# Patient Record
Sex: FEMALE | Race: BLACK OR AFRICAN AMERICAN | NOT HISPANIC OR LATINO | Employment: FULL TIME | URBAN - METROPOLITAN AREA
[De-identification: names, ages, dates, MRNs, and addresses within clinical notes are randomized per-mention and may not be internally consistent; named-entity substitution may affect disease eponyms.]

---

## 2017-12-04 ENCOUNTER — TELEPHONE (OUTPATIENT)
Dept: RADIOLOGY | Facility: HOSPITAL | Age: 37
End: 2017-12-04

## 2017-12-05 ENCOUNTER — HOSPITAL ENCOUNTER (OUTPATIENT)
Dept: RADIOLOGY | Facility: HOSPITAL | Age: 37
Discharge: HOME | End: 2017-12-05
Attending: INTERNAL MEDICINE
Payer: COMMERCIAL

## 2017-12-05 DIAGNOSIS — R10.2 PELVIC PAIN: ICD-10-CM

## 2017-12-05 PROCEDURE — 76830 TRANSVAGINAL US NON-OB: CPT | Mod: 26,,, | Performed by: RADIOLOGY

## 2017-12-05 PROCEDURE — 76856 US EXAM PELVIC COMPLETE: CPT | Mod: TC,PO

## 2017-12-05 PROCEDURE — 76856 US EXAM PELVIC COMPLETE: CPT | Mod: 26,,, | Performed by: RADIOLOGY

## 2019-03-06 PROBLEM — K13.0 DRY LIPS: Status: ACTIVE | Noted: 2019-03-06

## 2019-03-06 PROBLEM — J01.00 ACUTE NON-RECURRENT MAXILLARY SINUSITIS: Status: ACTIVE | Noted: 2019-03-06

## 2019-03-06 PROBLEM — J02.9 SORE THROAT: Status: ACTIVE | Noted: 2019-03-06

## 2019-03-06 PROBLEM — R68.2 DRY MOUTH: Status: ACTIVE | Noted: 2019-03-06

## 2019-03-10 PROBLEM — Z11.3 SCREEN FOR STD (SEXUALLY TRANSMITTED DISEASE): Status: ACTIVE | Noted: 2019-03-10

## 2019-03-15 PROBLEM — E53.8 BIOTIN DEFICIENCY DISEASE: Status: ACTIVE | Noted: 2019-03-15

## 2019-03-15 PROBLEM — E88.810 METABOLIC SYNDROME X: Status: ACTIVE | Noted: 2019-03-15

## 2019-03-15 PROBLEM — E55.9 VITAMIN D DEFICIENCY: Status: ACTIVE | Noted: 2019-03-15

## 2019-06-10 PROBLEM — J01.00 ACUTE NON-RECURRENT MAXILLARY SINUSITIS: Status: RESOLVED | Noted: 2019-03-06 | Resolved: 2019-06-10

## 2019-11-13 PROBLEM — F41.9 ANXIETY: Status: ACTIVE | Noted: 2019-11-13

## 2020-01-08 PROBLEM — K59.04 CHRONIC IDIOPATHIC CONSTIPATION: Status: ACTIVE | Noted: 2020-01-08

## 2020-01-08 PROBLEM — M54.50 LUMBAR SPINE PAIN: Status: ACTIVE | Noted: 2020-01-08

## 2020-02-11 PROBLEM — R68.2 DRY MOUTH: Status: RESOLVED | Noted: 2019-03-06 | Resolved: 2020-02-11

## 2020-02-11 PROBLEM — J02.9 SORE THROAT: Status: RESOLVED | Noted: 2019-03-06 | Resolved: 2020-02-11

## 2020-02-11 PROBLEM — Z00.00 ANNUAL PHYSICAL EXAM: Status: ACTIVE | Noted: 2019-03-10

## 2020-02-11 PROBLEM — Z11.3 SCREEN FOR STD (SEXUALLY TRANSMITTED DISEASE): Status: RESOLVED | Noted: 2019-03-10 | Resolved: 2020-02-11

## 2020-02-11 PROBLEM — K13.0 DRY LIPS: Status: RESOLVED | Noted: 2019-03-06 | Resolved: 2020-02-11

## 2020-03-04 ENCOUNTER — CLINICAL SUPPORT (OUTPATIENT)
Dept: AUDIOLOGY | Facility: CLINIC | Age: 40
End: 2020-03-04
Payer: COMMERCIAL

## 2020-03-04 DIAGNOSIS — H91.90 PERCEIVED HEARING LOSS: ICD-10-CM

## 2020-03-04 DIAGNOSIS — Z01.10 ENCOUNTER FOR EXAMINATION OF HEARING WITHOUT ABNORMAL FINDINGS: Primary | ICD-10-CM

## 2020-03-04 PROCEDURE — 92557 COMPREHENSIVE HEARING TEST: CPT | Mod: S$GLB,,, | Performed by: AUDIOLOGIST

## 2020-03-04 PROCEDURE — 92567 PR TYMPA2METRY: ICD-10-PCS | Mod: S$GLB,,, | Performed by: AUDIOLOGIST

## 2020-03-04 PROCEDURE — 99999 PR PBB SHADOW E&M-EST. PATIENT-LVL I: ICD-10-PCS | Mod: PBBFAC,,, | Performed by: AUDIOLOGIST

## 2020-03-04 PROCEDURE — 92567 TYMPANOMETRY: CPT | Mod: S$GLB,,, | Performed by: AUDIOLOGIST

## 2020-03-04 PROCEDURE — 99999 PR PBB SHADOW E&M-EST. PATIENT-LVL I: CPT | Mod: PBBFAC,,, | Performed by: AUDIOLOGIST

## 2020-03-04 PROCEDURE — 92557 PR COMPREHENSIVE HEARING TEST: ICD-10-PCS | Mod: S$GLB,,, | Performed by: AUDIOLOGIST

## 2020-03-04 NOTE — PROGRESS NOTES
Sharon Hutton was seen 03/04/2020 for an audiological evaluation.  Patient complains of possible bilateral gradual hearing loss. She reports having to ask her family members to repeat often. She denies any tinnitus or dizziness.     Results reveal normal hearing sensitivity 250-8000 Hz bilaterally.  Speech Reception Thresholds were  10 dBHL for the right ear and 10 dBHL for the left ear.   Word recognition scores were excellent bilaterally.  Tympanograms were Type A, normal for the right ear and Type A, normal for the left ear.    Patient was counseled on the above findings.    Recommendations include:    1. Wear hearing protective devices around loud noise and equipment.

## 2020-03-13 PROBLEM — R31.21 ASYMPTOMATIC MICROSCOPIC HEMATURIA: Status: ACTIVE | Noted: 2020-03-13

## 2020-05-18 PROBLEM — Z00.00 ANNUAL PHYSICAL EXAM: Status: RESOLVED | Noted: 2019-03-10 | Resolved: 2020-05-18

## 2020-10-12 PROBLEM — S83.207D: Status: ACTIVE | Noted: 2020-10-12

## 2021-10-13 RX ORDER — BENZONATATE 200 MG/1
CAPSULE ORAL
COMMUNITY
Start: 2021-10-04 | End: 2021-11-30

## 2022-10-20 PROBLEM — Z90.710 HISTORY OF ROBOT-ASSISTED LAPAROSCOPIC HYSTERECTOMY: Status: ACTIVE | Noted: 2022-10-20

## 2022-10-20 PROBLEM — Z98.890 S/P ROBOT-ASSISTED SURGICAL PROCEDURE: Status: ACTIVE | Noted: 2022-10-20

## 2022-10-20 PROBLEM — D25.9 UTERINE FIBROID: Status: ACTIVE | Noted: 2022-10-20

## 2023-03-27 ENCOUNTER — HOSPITAL ENCOUNTER (OUTPATIENT)
Dept: RADIOLOGY | Facility: HOSPITAL | Age: 43
Discharge: HOME | End: 2023-03-27
Attending: NURSE PRACTITIONER
Payer: COMMERCIAL

## 2023-03-27 ENCOUNTER — OFFICE VISIT (OUTPATIENT)
Dept: GASTROENTEROLOGY | Facility: CLINIC | Age: 43
End: 2023-03-27
Payer: COMMERCIAL

## 2023-03-27 VITALS
BODY MASS INDEX: 31.44 KG/M2 | HEIGHT: 65 IN | SYSTOLIC BLOOD PRESSURE: 128 MMHG | HEART RATE: 76 BPM | DIASTOLIC BLOOD PRESSURE: 76 MMHG | WEIGHT: 188.69 LBS

## 2023-03-27 DIAGNOSIS — K59.09 CHRONIC CONSTIPATION: Primary | ICD-10-CM

## 2023-03-27 DIAGNOSIS — K59.09 CHRONIC CONSTIPATION: ICD-10-CM

## 2023-03-27 DIAGNOSIS — K52.9 COLITIS: ICD-10-CM

## 2023-03-27 DIAGNOSIS — R10.84 GENERALIZED ABDOMINAL PAIN: ICD-10-CM

## 2023-03-27 PROCEDURE — 3074F PR MOST RECENT SYSTOLIC BLOOD PRESSURE < 130 MM HG: ICD-10-PCS | Mod: CPTII,S$GLB,, | Performed by: NURSE PRACTITIONER

## 2023-03-27 PROCEDURE — 99204 PR OFFICE/OUTPT VISIT, NEW, LEVL IV, 45-59 MIN: ICD-10-PCS | Mod: S$GLB,,, | Performed by: NURSE PRACTITIONER

## 2023-03-27 PROCEDURE — 3074F SYST BP LT 130 MM HG: CPT | Mod: CPTII,S$GLB,, | Performed by: NURSE PRACTITIONER

## 2023-03-27 PROCEDURE — 3078F PR MOST RECENT DIASTOLIC BLOOD PRESSURE < 80 MM HG: ICD-10-PCS | Mod: CPTII,S$GLB,, | Performed by: NURSE PRACTITIONER

## 2023-03-27 PROCEDURE — 74019 RADEX ABDOMEN 2 VIEWS: CPT | Mod: 26,,, | Performed by: RADIOLOGY

## 2023-03-27 PROCEDURE — 99999 PR PBB SHADOW E&M-EST. PATIENT-LVL IV: ICD-10-PCS | Mod: PBBFAC,,, | Performed by: NURSE PRACTITIONER

## 2023-03-27 PROCEDURE — 1159F PR MEDICATION LIST DOCUMENTED IN MEDICAL RECORD: ICD-10-PCS | Mod: CPTII,S$GLB,, | Performed by: NURSE PRACTITIONER

## 2023-03-27 PROCEDURE — 74019 XR ABDOMEN FLAT AND ERECT: ICD-10-PCS | Mod: 26,,, | Performed by: RADIOLOGY

## 2023-03-27 PROCEDURE — 3008F PR BODY MASS INDEX (BMI) DOCUMENTED: ICD-10-PCS | Mod: CPTII,S$GLB,, | Performed by: NURSE PRACTITIONER

## 2023-03-27 PROCEDURE — 3078F DIAST BP <80 MM HG: CPT | Mod: CPTII,S$GLB,, | Performed by: NURSE PRACTITIONER

## 2023-03-27 PROCEDURE — 1159F MED LIST DOCD IN RCRD: CPT | Mod: CPTII,S$GLB,, | Performed by: NURSE PRACTITIONER

## 2023-03-27 PROCEDURE — 99999 PR PBB SHADOW E&M-EST. PATIENT-LVL IV: CPT | Mod: PBBFAC,,, | Performed by: NURSE PRACTITIONER

## 2023-03-27 PROCEDURE — 99204 OFFICE O/P NEW MOD 45 MIN: CPT | Mod: S$GLB,,, | Performed by: NURSE PRACTITIONER

## 2023-03-27 PROCEDURE — 74019 RADEX ABDOMEN 2 VIEWS: CPT | Mod: TC

## 2023-03-27 PROCEDURE — 3008F BODY MASS INDEX DOCD: CPT | Mod: CPTII,S$GLB,, | Performed by: NURSE PRACTITIONER

## 2023-03-27 RX ORDER — SOD SULF/POT CHLORIDE/MAG SULF 1.479 G
TABLET ORAL
COMMUNITY
Start: 2023-03-17

## 2023-03-27 NOTE — PROGRESS NOTES
Clinic Consult:  [facility] Consultation Note    Reason for Consult:  The primary encounter diagnosis was Chronic constipation. Diagnoses of Generalized abdominal pain and Colitis were also pertinent to this visit.    PCP: Aaliyah Medrano   No address on file    HPI:  This is a 43 y.o. female here for evaluation of the above  Pt reports a long history of chronic constipation that she has been treating with OTC medications.   She states that about 2 weeks ago she started having severe abdominal pain that ultimately required evaluation at [facility].  She states that a CT was completed at that time and she was diagnosed with colitis.  She was started on Abx which she completed yesterday.  I do not have those records for review today.   She has had resolution of the severe abdominal pain.   Continues with constipation.   No previous colonoscopy.   Has had a previous episode of BRBPR after passing a hard stool  No fam hx of IBD or CRC.       Review of Systems   Constitutional:  Negative for chills, fever, malaise/fatigue and weight loss.   Respiratory:  Negative for cough.    Cardiovascular:  Negative for chest pain.   Gastrointestinal:         Per HPI   Musculoskeletal:  Negative for myalgias.   Skin:  Negative for itching and rash.   Neurological:  Negative for headaches.   Psychiatric/Behavioral:  The patient is not nervous/anxious.      Medical History:   Past Medical History:   Diagnosis Date    Adult general medical examination 04/10/2017    Anemia     Biotin deficiency disease     Hyperlipidemia     Metabolic syndrome X     Vitamin D deficiency        Surgical History:  Past Surgical History:   Procedure Laterality Date    BREAST BIOPSY Left 09/2020    HERNIA REPAIR      ROBOTIC HYSTERECTOMY, WITH SALPINGECTOMY  2016       Family History:   Family History   Problem Relation Age of Onset    Diabetes Mother     Hypertension Mother     Asthma Sister        Social History:   Social History     Tobacco  "Use    Smoking status: Never    Smokeless tobacco: Never   Substance Use Topics    Alcohol use: Yes       Allergies: Reviewed    Home Medications:   Current Outpatient Medications on File Prior to Visit   Medication Sig Dispense Refill    cholecalciferol, vitamin D3, (VITAMIN D3) 250 mcg (10,000 unit) Cap capsule Take 2 capsules (20,000 Units total) by mouth once a week. 24 capsule 1    meloxicam (MOBIC) 15 MG tablet TAKE 1 TABLET(15 MG) BY MOUTH EVERY DAY 90 tablet 0    metFORMIN (GLUCOPHAGE-XR) 500 MG ER 24hr tablet Take 1 tablet (500 mg total) by mouth daily with breakfast. 30 tablet 6    SUTAB 1.479-0.188- 0.225 gram tablet TAKE 24 TABLETS BY MOUTH SPLIT DOSE AS DIRECTED FOR COLONOSCOPY PREP      traMADoL (ULTRAM) 50 mg tablet Take 1 tablet (50 mg total) by mouth every 6 (six) hours. 30 each 0     No current facility-administered medications on file prior to visit.       Physical Exam:  Vital Signs:  /76 (BP Location: Left arm, Patient Position: Sitting, BP Method: Large (Manual))   Pulse 76   Ht 5' 5" (1.651 m)   Wt 85.6 kg (188 lb 11.4 oz)   BMI 31.40 kg/m²   Body mass index is 31.4 kg/m².  Physical Exam  Vitals reviewed.   Constitutional:       Appearance: She is well-developed.   HENT:      Head: Normocephalic.   Eyes:      General: No scleral icterus.  Cardiovascular:      Rate and Rhythm: Normal rate.   Pulmonary:      Effort: Pulmonary effort is normal.   Abdominal:      General: There is no distension.      Palpations: Abdomen is soft.   Musculoskeletal:         General: Normal range of motion.      Cervical back: Normal range of motion.   Skin:     General: Skin is dry.   Neurological:      Mental Status: She is alert and oriented to person, place, and time.       Labs: Pertinent labs reviewed.      Assessment:  1. Chronic constipation    2. Generalized abdominal pain    3. Colitis         Recommendations:  Chronic constipation  Generalized abdominal pain  - will get an x-ray today to " determine stool burden.   - will likely need a bowel purge followed by once daily miralax or prescription linzess or Amitiza depending on results  - increase water and dietary fiber.   -     X-Ray Abdomen Flat And Erect; Future; Expected date: 03/27/2023        Colitis  - ? infectious vs inflammatory  - will request records of imaging from [facility] for review  - pt will likely need a colonoscopy to ensure no chronic inflammatory disease, however, if there was evidence of diverticulitis, will need to push colonoscopy for 6-8 weeks to ensure appropriate healing.         Follow up to be determined by results of above.          Thank you so much for allowing me to participate in the care of TANYA Keyes

## 2023-04-03 ENCOUNTER — TELEPHONE (OUTPATIENT)
Dept: HEPATOLOGY | Facility: CLINIC | Age: 43
End: 2023-04-03
Payer: COMMERCIAL

## 2023-04-03 NOTE — TELEPHONE ENCOUNTER
Spoke with patient on [phone] in reference to receiving most recent CT sans from [facility] for review. Patient has been advised that we sent the request for the CT scan to be sent to us on 3/27/23, but to no avail. Patient states she will reach out to [facility] and request for them to send us the results.     ----- Message from ALANA Garay sent at 4/3/2023  2:00 PM CDT -----  Contact: Sharon  I do not have those records on my desk.  May need to re-request       ----- Message -----  From: Sury Ritchie MA  Sent: 4/3/2023   1:27 PM CDT  To: ALANA Garay    Patient is inquiring if you were able to review the scans from the hospital. Please advise.   ----- Message -----  From: Roselyn Hernandez  Sent: 4/3/2023  12:03 PM CDT  To: Antonia MENJIVAR Staff    Patient is calling to speak with someone regarding scans. Patient request to be informed if provider has viewed scans from hospital. Please give patient a call back at [phone] when possible.   Thank you,   GH

## 2023-04-18 ENCOUNTER — TELEPHONE (OUTPATIENT)
Dept: HEPATOLOGY | Facility: CLINIC | Age: 43
End: 2023-04-18
Payer: COMMERCIAL

## 2023-04-18 NOTE — TELEPHONE ENCOUNTER
Spoke with patient on [phone] in reference to resending fax to request most recent imaging from [facility]. Request sent to fax number provided. Patient voices understanding.     ----- Message from Mahi Calix sent at 4/18/2023  1:11 PM CDT -----  Contact: bri Tenorio is calling to speak with the nurse regarding records. Reports needing a record request for the [facility] for a scan that was done. Fax # [phone]. Please give the patient a call back at [phone]  Thanks leslie

## 2023-04-28 DIAGNOSIS — M25.521 RIGHT ELBOW PAIN: Primary | ICD-10-CM

## 2023-05-02 ENCOUNTER — OFFICE VISIT (OUTPATIENT)
Dept: ORTHOPEDICS | Facility: CLINIC | Age: 43
End: 2023-05-02
Payer: COMMERCIAL

## 2023-05-02 ENCOUNTER — HOSPITAL ENCOUNTER (OUTPATIENT)
Dept: RADIOLOGY | Facility: HOSPITAL | Age: 43
Discharge: HOME | End: 2023-05-02
Attending: ORTHOPAEDIC SURGERY
Payer: COMMERCIAL

## 2023-05-02 VITALS — HEIGHT: 65 IN | WEIGHT: 188 LBS | BODY MASS INDEX: 31.32 KG/M2

## 2023-05-02 DIAGNOSIS — M77.11 LATERAL EPICONDYLITIS OF RIGHT ELBOW: Primary | ICD-10-CM

## 2023-05-02 DIAGNOSIS — M25.521 RIGHT ELBOW PAIN: ICD-10-CM

## 2023-05-02 PROCEDURE — 99999 PR PBB SHADOW E&M-EST. PATIENT-LVL III: CPT | Mod: PBBFAC,,, | Performed by: ORTHOPAEDIC SURGERY

## 2023-05-02 PROCEDURE — 99213 PR OFFICE/OUTPT VISIT, EST, LEVL III, 20-29 MIN: ICD-10-PCS | Mod: 25,S$GLB,, | Performed by: ORTHOPAEDIC SURGERY

## 2023-05-02 PROCEDURE — 73080 XR ELBOW COMPLETE 3 VIEW RIGHT: ICD-10-PCS | Mod: 26,RT,, | Performed by: RADIOLOGY

## 2023-05-02 PROCEDURE — 1160F PR REVIEW ALL MEDS BY PRESCRIBER/CLIN PHARMACIST DOCUMENTED: ICD-10-PCS | Mod: CPTII,S$GLB,, | Performed by: ORTHOPAEDIC SURGERY

## 2023-05-02 PROCEDURE — 3008F PR BODY MASS INDEX (BMI) DOCUMENTED: ICD-10-PCS | Mod: CPTII,S$GLB,, | Performed by: ORTHOPAEDIC SURGERY

## 2023-05-02 PROCEDURE — 1159F PR MEDICATION LIST DOCUMENTED IN MEDICAL RECORD: ICD-10-PCS | Mod: CPTII,S$GLB,, | Performed by: ORTHOPAEDIC SURGERY

## 2023-05-02 PROCEDURE — 20551 NJX 1 TENDON ORIGIN/INSJ: CPT | Mod: RT,S$GLB,, | Performed by: ORTHOPAEDIC SURGERY

## 2023-05-02 PROCEDURE — 73080 X-RAY EXAM OF ELBOW: CPT | Mod: TC,RT

## 2023-05-02 PROCEDURE — 99213 OFFICE O/P EST LOW 20 MIN: CPT | Mod: 25,S$GLB,, | Performed by: ORTHOPAEDIC SURGERY

## 2023-05-02 PROCEDURE — 73080 X-RAY EXAM OF ELBOW: CPT | Mod: 26,RT,, | Performed by: RADIOLOGY

## 2023-05-02 PROCEDURE — 3008F BODY MASS INDEX DOCD: CPT | Mod: CPTII,S$GLB,, | Performed by: ORTHOPAEDIC SURGERY

## 2023-05-02 PROCEDURE — 20551 TENDON ORIGIN: R ELBOW: ICD-10-PCS | Mod: RT,S$GLB,, | Performed by: ORTHOPAEDIC SURGERY

## 2023-05-02 PROCEDURE — 99999 PR PBB SHADOW E&M-EST. PATIENT-LVL III: ICD-10-PCS | Mod: PBBFAC,,, | Performed by: ORTHOPAEDIC SURGERY

## 2023-05-02 PROCEDURE — 1160F RVW MEDS BY RX/DR IN RCRD: CPT | Mod: CPTII,S$GLB,, | Performed by: ORTHOPAEDIC SURGERY

## 2023-05-02 PROCEDURE — 1159F MED LIST DOCD IN RCRD: CPT | Mod: CPTII,S$GLB,, | Performed by: ORTHOPAEDIC SURGERY

## 2023-05-02 RX ORDER — TRIAMCINOLONE ACETONIDE 40 MG/ML
40 INJECTION, SUSPENSION INTRA-ARTICULAR; INTRAMUSCULAR
Status: DISCONTINUED | OUTPATIENT
Start: 2023-05-02 | End: 2023-05-02 | Stop reason: HOSPADM

## 2023-05-02 RX ADMIN — TRIAMCINOLONE ACETONIDE 40 MG: 40 INJECTION, SUSPENSION INTRA-ARTICULAR; INTRAMUSCULAR at 09:05

## 2023-05-02 NOTE — PROCEDURES
Tendon Origin: R elbow    Date/Time: 5/2/2023 9:30 AM  Performed by: Jesse Cooper MD  Authorized by: Jesse Cooper MD     Consent Done?:  Yes (Verbal)  Timeout: prior to procedure the correct patient, procedure, and site was verified    Indications:  Pain  Timeout: prior to procedure the correct patient, procedure, and site was verified    Location:  Elbow  Site:  R elbow  Prep: patient was prepped and draped in usual sterile fashion    Needle size:  25 G  Medications:  40 mg triamcinolone acetonide 40 mg/mL

## 2023-05-02 NOTE — PROGRESS NOTES
Subjective:     Patient ID: Sharon Hutton is a 43 y.o. female.    Chief Complaint: Pain of the Right Elbow      HPI:  The patient is a 43-year-old female with right elbow lateral epicondylitis for the last 4 months.  She is not tried injection.  Her daughter has a tennis elbow brace that she will try    Past Medical History:   Diagnosis Date    Adult general medical examination 04/10/2017    Anemia     Biotin deficiency disease     Hyperlipidemia     Metabolic syndrome X     Vitamin D deficiency      Past Surgical History:   Procedure Laterality Date    BREAST BIOPSY Left 09/2020    HERNIA REPAIR      ROBOTIC HYSTERECTOMY, WITH SALPINGECTOMY  2016     Family History   Problem Relation Age of Onset    Diabetes Mother     Hypertension Mother     Asthma Sister      Social History     Socioeconomic History    Marital status:    Tobacco Use    Smoking status: Never    Smokeless tobacco: Never   Substance and Sexual Activity    Alcohol use: Yes     Medication List with Changes/Refills   Current Medications    CHOLECALCIFEROL, VITAMIN D3, (VITAMIN D3) 250 MCG (10,000 UNIT) CAP CAPSULE    Take 2 capsules (20,000 Units total) by mouth once a week.    FLUCONAZOLE (DIFLUCAN) 150 MG TAB    One tab now and one tab in one week    MELOXICAM (MOBIC) 15 MG TABLET    TAKE 1 TABLET(15 MG) BY MOUTH EVERY DAY    METFORMIN (GLUCOPHAGE-XR) 500 MG ER 24HR TABLET    Take 1 tablet (500 mg total) by mouth daily with breakfast.    SUTAB 1.479-0.188- 0.225 GRAM TABLET    TAKE 24 TABLETS BY MOUTH SPLIT DOSE AS DIRECTED FOR COLONOSCOPY PREP    TRAMADOL (ULTRAM) 50 MG TABLET    Take 1 tablet (50 mg total) by mouth every 6 (six) hours.     Review of patient's allergies indicates:   Allergen Reactions    Trulicity [dulaglutide]      Abdominal pain     Review of Systems   Constitutional: Negative for malaise/fatigue.   HENT:  Negative for hearing loss.    Eyes:  Negative for double vision and visual disturbance.   Cardiovascular:  Negative for  chest pain.   Respiratory:  Negative for shortness of breath.    Endocrine: Negative for cold intolerance.   Hematologic/Lymphatic: Does not bruise/bleed easily.   Skin:  Negative for poor wound healing and suspicious lesions.   Musculoskeletal:  Positive for back pain. Negative for gout, joint pain and joint swelling.   Gastrointestinal:  Positive for constipation. Negative for nausea and vomiting.   Genitourinary:  Positive for hematuria. Negative for dysuria.   Neurological:  Negative for numbness, paresthesias and sensory change.   Psychiatric/Behavioral:  Negative for depression, memory loss and substance abuse. The patient is nervous/anxious.    Allergic/Immunologic: Negative for persistent infections.     Objective:   Body mass index is 31.28 kg/m².  There were no vitals filed for this visit.             General    Constitutional: She is oriented to person, place, and time. She appears well-developed and well-nourished. No distress.   HENT:   Head: Normocephalic.   Eyes: EOM are normal.   Pulmonary/Chest: Effort normal.   Neurological: She is oriented to person, place, and time.   Psychiatric: She has a normal mood and affect.             Right Hand/Wrist Exam     Inspection   Scars: Wrist - absent   Effusion: Wrist - absent     Other     Neuorologic Exam    Median Distribution: normal  Ulnar Distribution: normal  Radial Distribution: normal      Right Elbow Exam     Inspection   Scars: absent  Effusion: absent    Pain   The patient exhibits pain of the lateral epicondyle    Other   Sensation: normal    Comments:  The patient has tenderness right elbow lateral epicondyle with pain on resisted wrist and finger extension.          Vascular Exam       Capillary Refill  Right Hand: normal capillary refill        Relevant imaging results reviewed and interpreted by me, discussed with the patient and / or family today radiographs right elbow were normal  Assessment:     Encounter Diagnoses   Name Primary?    Right  elbow pain     Lateral epicondylitis of right elbow Yes        Plan:       The patient injected right elbow lateral epicondyle with 1 cc of Kenalog and 1 cc of 2% plain lidocaine.  She will use her daughters tennis elbow brace.  She will wait at least 3 months between injections.  I recommended Dr. Rangel gr procedure at the [facility] if her symptoms recur              Disclaimer: This note was prepared using a voice recognition system and is likely to have sound alike errors within the text.

## 2023-12-13 ENCOUNTER — OFFICE VISIT (OUTPATIENT)
Dept: ORTHOPEDICS | Facility: CLINIC | Age: 43
End: 2023-12-13
Payer: COMMERCIAL

## 2023-12-13 VITALS — HEIGHT: 65 IN | BODY MASS INDEX: 32.32 KG/M2 | WEIGHT: 194 LBS

## 2023-12-13 DIAGNOSIS — M77.11 LATERAL EPICONDYLITIS OF RIGHT ELBOW: Primary | ICD-10-CM

## 2023-12-13 PROCEDURE — 20551 NJX 1 TENDON ORIGIN/INSJ: CPT | Mod: RT,S$GLB,, | Performed by: ORTHOPAEDIC SURGERY

## 2023-12-13 PROCEDURE — 3008F BODY MASS INDEX DOCD: CPT | Mod: CPTII,S$GLB,, | Performed by: ORTHOPAEDIC SURGERY

## 2023-12-13 PROCEDURE — 20551 TENDON ORIGIN: R ELBOW: ICD-10-PCS | Mod: RT,S$GLB,, | Performed by: ORTHOPAEDIC SURGERY

## 2023-12-13 PROCEDURE — 1159F PR MEDICATION LIST DOCUMENTED IN MEDICAL RECORD: ICD-10-PCS | Mod: CPTII,S$GLB,, | Performed by: ORTHOPAEDIC SURGERY

## 2023-12-13 PROCEDURE — 1160F RVW MEDS BY RX/DR IN RCRD: CPT | Mod: CPTII,S$GLB,, | Performed by: ORTHOPAEDIC SURGERY

## 2023-12-13 PROCEDURE — 1159F MED LIST DOCD IN RCRD: CPT | Mod: CPTII,S$GLB,, | Performed by: ORTHOPAEDIC SURGERY

## 2023-12-13 PROCEDURE — 99999 PR PBB SHADOW E&M-EST. PATIENT-LVL III: ICD-10-PCS | Mod: PBBFAC,,, | Performed by: ORTHOPAEDIC SURGERY

## 2023-12-13 PROCEDURE — 99213 OFFICE O/P EST LOW 20 MIN: CPT | Mod: 25,S$GLB,, | Performed by: ORTHOPAEDIC SURGERY

## 2023-12-13 PROCEDURE — 3044F PR MOST RECENT HEMOGLOBIN A1C LEVEL <7.0%: ICD-10-PCS | Mod: CPTII,S$GLB,, | Performed by: ORTHOPAEDIC SURGERY

## 2023-12-13 PROCEDURE — 99999 PR PBB SHADOW E&M-EST. PATIENT-LVL III: CPT | Mod: PBBFAC,,, | Performed by: ORTHOPAEDIC SURGERY

## 2023-12-13 PROCEDURE — 99213 PR OFFICE/OUTPT VISIT, EST, LEVL III, 20-29 MIN: ICD-10-PCS | Mod: 25,S$GLB,, | Performed by: ORTHOPAEDIC SURGERY

## 2023-12-13 PROCEDURE — 3008F PR BODY MASS INDEX (BMI) DOCUMENTED: ICD-10-PCS | Mod: CPTII,S$GLB,, | Performed by: ORTHOPAEDIC SURGERY

## 2023-12-13 PROCEDURE — 3044F HG A1C LEVEL LT 7.0%: CPT | Mod: CPTII,S$GLB,, | Performed by: ORTHOPAEDIC SURGERY

## 2023-12-13 PROCEDURE — 1160F PR REVIEW ALL MEDS BY PRESCRIBER/CLIN PHARMACIST DOCUMENTED: ICD-10-PCS | Mod: CPTII,S$GLB,, | Performed by: ORTHOPAEDIC SURGERY

## 2023-12-13 RX ORDER — TRIAMCINOLONE ACETONIDE 40 MG/ML
40 INJECTION, SUSPENSION INTRA-ARTICULAR; INTRAMUSCULAR
Status: DISCONTINUED | OUTPATIENT
Start: 2023-12-13 | End: 2023-12-13 | Stop reason: HOSPADM

## 2023-12-13 RX ADMIN — TRIAMCINOLONE ACETONIDE 40 MG: 40 INJECTION, SUSPENSION INTRA-ARTICULAR; INTRAMUSCULAR at 09:12

## 2023-12-13 NOTE — PROCEDURES
Tendon Origin: R elbow    Date/Time: 12/13/2023 9:00 AM    Performed by: Jesse Cooper MD  Authorized by: Jesse Cooper MD    Consent Done?:  Yes (Verbal)  Indications:  Pain  Location:  Elbow  Site:  R elbow  Ultrasonic Guidance for Needle Placement?: No    Needle size:  25 G  Approach:  Volar  Medications:  40 mg triamcinolone acetonide 40 mg/mL

## 2023-12-13 NOTE — PROGRESS NOTES
Subjective:     Patient ID: Sharon Hutton is a 43 y.o. female.    Chief Complaint: Pain of the Right Elbow      HPI:  The patient is a 43-year-old female with right elbow lateral epicondylitis.  She was last injected 05/02/2023 with good results until recently and requests reinjection today    Past Medical History:   Diagnosis Date    Adult general medical examination 04/10/2017    Anemia     Biotin deficiency disease     Hyperlipidemia     Metabolic syndrome X     Vitamin D deficiency      Past Surgical History:   Procedure Laterality Date    BREAST BIOPSY Left 09/2020    HERNIA REPAIR      ROBOTIC HYSTERECTOMY, WITH SALPINGECTOMY  2016     Family History   Problem Relation Age of Onset    Diabetes Mother     Hypertension Mother     Asthma Sister      Social History     Socioeconomic History    Marital status:    Tobacco Use    Smoking status: Never    Smokeless tobacco: Never   Substance and Sexual Activity    Alcohol use: Yes     Medication List with Changes/Refills   Current Medications    CHOLECALCIFEROL, VITAMIN D3, (VITAMIN D3) 250 MCG (10,000 UNIT) CAP CAPSULE    Take 2 capsules (20,000 Units total) by mouth once a week.    FERROUS SULFATE (FEOSOL) 325 MG (65 MG IRON) TAB TABLET    Take 1 tablet (325 mg total) by mouth daily with breakfast.    LACTOBACILLUS COMBINATION NO.9 (ADULT 50 PLUS PROBIOTIC) 4 BILLION CELL CAP    Take by mouth.    METFORMIN (GLUCOPHAGE-XR) 500 MG ER 24HR TABLET    Take 2 tablets (1,000 mg total) by mouth daily with breakfast.    SUTAB 1.479-0.188- 0.225 GRAM TABLET    TAKE 24 TABLETS BY MOUTH SPLIT DOSE AS DIRECTED FOR COLONOSCOPY PREP    TIRZEPATIDE (MOUNJARO) 2.5 MG/0.5 ML PNIJ    Inject 2.5 mg into the skin every 7 days.     Review of patient's allergies indicates:   Allergen Reactions    Trulicity [dulaglutide]      Abdominal pain     Review of Systems   Constitutional: Negative for malaise/fatigue.   HENT:  Negative for hearing loss.    Eyes:  Negative for double vision  and visual disturbance.   Cardiovascular:  Negative for chest pain.   Respiratory:  Negative for shortness of breath.    Endocrine: Negative for cold intolerance.   Hematologic/Lymphatic: Does not bruise/bleed easily.   Skin:  Negative for poor wound healing and suspicious lesions.   Musculoskeletal:  Negative for gout, joint pain and joint swelling.   Gastrointestinal:  Positive for constipation. Negative for nausea and vomiting.   Genitourinary:  Positive for hematuria. Negative for dysuria.   Neurological:  Negative for numbness, paresthesias and sensory change.   Psychiatric/Behavioral:  Negative for depression, memory loss and substance abuse. The patient is nervous/anxious.    Allergic/Immunologic: Negative for persistent infections.       Objective:   Body mass index is 32.28 kg/m².  There were no vitals filed for this visit.             General    Constitutional: She is oriented to person, place, and time. She appears well-developed and well-nourished. No distress.   HENT:   Head: Normocephalic.   Eyes: EOM are normal.   Pulmonary/Chest: Effort normal.   Neurological: She is oriented to person, place, and time.   Psychiatric: She has a normal mood and affect.             Right Hand/Wrist Exam     Other     Neuorologic Exam    Median Distribution: normal  Ulnar Distribution: normal  Radial Distribution: normal      Right Elbow Exam     Inspection   Scars: absent  Effusion: absent    Pain   The patient exhibits pain of the lateral epicondyle    Other   Sensation: normal    Comments:  The patient has tenderness well localized to the right elbow lateral epicondyle with pain on resisted wrist and finger extension          Vascular Exam       Capillary Refill  Right Hand: normal capillary refill          Relevant imaging results reviewed and interpreted by me, discussed with the patient and / or family today radiographs right elbow were normal  Assessment:     Encounter Diagnosis   Name Primary?    Lateral  epicondylitis of right elbow Yes        Plan:     The patient injected right elbow lateral epicondyle with 1 cc of Kenalog and 1 cc of 2% plain lidocaine.  If she continues to be symptomatic she may be a candidate for a tenjet procedure by Dr. Multani at the [facility]                Disclaimer: This note was prepared using a voice recognition system and is likely to have sound alike errors within the text.

## 2024-04-11 ENCOUNTER — TELEPHONE (OUTPATIENT)
Dept: ORTHOPEDICS | Facility: CLINIC | Age: 44
End: 2024-04-11
Payer: COMMERCIAL

## 2024-04-11 NOTE — TELEPHONE ENCOUNTER
Spoke to the patient an was able to get her scheduled with Dr. Cooper for tomorrow morning at 8:00 the patient verbalized understanding.       ----- Message from Makayla Booth sent at 4/11/2024  8:13 AM CDT -----  Contact: Sharon Tenorio is calling to schedule f/u injection appt for elbow. Please call back at [phone].        Thanks

## 2024-04-12 ENCOUNTER — OFFICE VISIT (OUTPATIENT)
Dept: ORTHOPEDICS | Facility: CLINIC | Age: 44
End: 2024-04-12
Payer: COMMERCIAL

## 2024-04-12 VITALS — WEIGHT: 184.94 LBS | HEIGHT: 65 IN | BODY MASS INDEX: 30.81 KG/M2

## 2024-04-12 DIAGNOSIS — M77.11 LATERAL EPICONDYLITIS OF RIGHT ELBOW: Primary | ICD-10-CM

## 2024-04-12 PROCEDURE — 99999 PR PBB SHADOW E&M-EST. PATIENT-LVL III: CPT | Mod: PBBFAC,,, | Performed by: ORTHOPAEDIC SURGERY

## 2024-04-12 PROCEDURE — 3008F BODY MASS INDEX DOCD: CPT | Mod: CPTII,S$GLB,, | Performed by: ORTHOPAEDIC SURGERY

## 2024-04-12 PROCEDURE — 3044F HG A1C LEVEL LT 7.0%: CPT | Mod: CPTII,S$GLB,, | Performed by: ORTHOPAEDIC SURGERY

## 2024-04-12 PROCEDURE — 99213 OFFICE O/P EST LOW 20 MIN: CPT | Mod: 25,S$GLB,, | Performed by: ORTHOPAEDIC SURGERY

## 2024-04-12 PROCEDURE — 1159F MED LIST DOCD IN RCRD: CPT | Mod: CPTII,S$GLB,, | Performed by: ORTHOPAEDIC SURGERY

## 2024-04-12 PROCEDURE — 1160F RVW MEDS BY RX/DR IN RCRD: CPT | Mod: CPTII,S$GLB,, | Performed by: ORTHOPAEDIC SURGERY

## 2024-04-12 PROCEDURE — 20551 NJX 1 TENDON ORIGIN/INSJ: CPT | Mod: S$GLB,,, | Performed by: ORTHOPAEDIC SURGERY

## 2024-04-12 RX ORDER — TRIAMCINOLONE ACETONIDE 40 MG/ML
40 INJECTION, SUSPENSION INTRA-ARTICULAR; INTRAMUSCULAR
Status: DISCONTINUED | OUTPATIENT
Start: 2024-04-12 | End: 2024-04-12 | Stop reason: HOSPADM

## 2024-04-12 RX ADMIN — TRIAMCINOLONE ACETONIDE 40 MG: 40 INJECTION, SUSPENSION INTRA-ARTICULAR; INTRAMUSCULAR at 08:04

## 2024-04-12 NOTE — PROGRESS NOTES
Subjective:     Patient ID: Sharon Hutton is a 44 y.o. female.    Chief Complaint: Pain of the Right Elbow      HPI:  The patient is a 44-year-old female with right elbow lateral epicondylitis last injected 12/13/2023.  She requests injection today    Past Medical History:   Diagnosis Date    Adult general medical examination 04/10/2017    Anemia     Biotin deficiency disease     Hyperlipidemia     Metabolic syndrome X     Vitamin D deficiency      Past Surgical History:   Procedure Laterality Date    BREAST BIOPSY Left 09/2020    HERNIA REPAIR      ROBOTIC HYSTERECTOMY, WITH SALPINGECTOMY  2016     Family History   Problem Relation Age of Onset    Diabetes Mother     Hypertension Mother     Asthma Sister      Social History     Socioeconomic History    Marital status:    Tobacco Use    Smoking status: Never    Smokeless tobacco: Never   Substance and Sexual Activity    Alcohol use: Yes     Medication List with Changes/Refills   Current Medications    ALPRAZOLAM (XANAX) 0.25 MG TABLET    Take 1 tablet (0.25 mg total) by mouth 2 (two) times daily as needed for Anxiety.    CHOLECALCIFEROL, VITAMIN D3, (VITAMIN D3) 250 MCG (10,000 UNIT) CAP CAPSULE    Take 2 capsules (20,000 Units total) by mouth once a week.    FERROUS SULFATE (FEOSOL) 325 MG (65 MG IRON) TAB TABLET    Take 1 tablet (325 mg total) by mouth daily with breakfast.    LACTOBACILLUS COMBINATION NO.9 (ADULT 50 PLUS PROBIOTIC) 4 BILLION CELL CAP    Take by mouth.    METFORMIN (GLUCOPHAGE-XR) 500 MG ER 24HR TABLET    Take 2 tablets (1,000 mg total) by mouth daily with breakfast.    SUTAB 1.479-0.188- 0.225 GRAM TABLET    TAKE 24 TABLETS BY MOUTH SPLIT DOSE AS DIRECTED FOR COLONOSCOPY PREP    TIRZEPATIDE, WEIGHT LOSS, (ZEPBOUND) 2.5 MG/0.5 ML PNIJ    Inject 2.5 mg into the skin every 7 days.     Review of patient's allergies indicates:   Allergen Reactions    Trulicity [dulaglutide]      Abdominal pain     Review of Systems   Constitutional: Negative  for malaise/fatigue.   HENT:  Negative for hearing loss.    Eyes:  Negative for double vision and visual disturbance.   Cardiovascular:  Negative for chest pain.   Respiratory:  Negative for shortness of breath.    Endocrine: Negative for cold intolerance.   Hematologic/Lymphatic: Does not bruise/bleed easily.   Skin:  Negative for poor wound healing and suspicious lesions.   Musculoskeletal:  Positive for back pain. Negative for gout, joint pain and joint swelling.   Gastrointestinal:  Positive for abdominal pain. Negative for nausea and vomiting.   Genitourinary:  Positive for hematuria. Negative for dysuria.   Neurological:  Negative for numbness, paresthesias and sensory change.   Psychiatric/Behavioral:  Negative for depression, memory loss and substance abuse. The patient is nervous/anxious.    Allergic/Immunologic: Negative for persistent infections.       Objective:   Body mass index is 30.78 kg/m².  There were no vitals filed for this visit.             General    Constitutional: She is oriented to person, place, and time. She appears well-developed and well-nourished. No distress.   HENT:   Head: Normocephalic.   Eyes: EOM are normal.   Pulmonary/Chest: Effort normal.   Neurological: She is oriented to person, place, and time.   Psychiatric: She has a normal mood and affect.             Right Hand/Wrist Exam     Other     Neuorologic Exam    Median Distribution: normal  Ulnar Distribution: normal  Radial Distribution: normal      Right Elbow Exam     Inspection   Scars: absent  Effusion: absent    Pain   The patient exhibits pain of the lateral epicondyle    Other   Sensation: normal    Comments:  The patient has tenderness well localized right elbow lateral epicondyle with pain on resisted wrist and finger extension          Vascular Exam       Capillary Refill  Right Hand: normal capillary refill         radiographs were not obtained today  Assessment:     Encounter Diagnosis   Name Primary?    Lateral  epicondylitis of right elbow Yes        Plan:   The patient injected right elbow lateral epicondyle with 1 cc Kenalog and 1 cc 2% plain lidocaine under sterile technique.  She will wait at least 3 months between injections.  She may wish to consider tenjet procedure with Dr. Multani and was given literature about that                Disclaimer: This note was prepared using a voice recognition system and is likely to have sound alike errors within the text.

## 2024-04-12 NOTE — PROCEDURES
Tendon Origin: R elbow    Date/Time: 4/12/2024 8:00 AM    Performed by: Jesse Cooper MD  Authorized by: Jesse Cooper MD    Consent Done?:  Yes (Verbal)  Timeout: prior to procedure the correct patient, procedure, and site was verified    Timeout: prior to procedure the correct patient, procedure, and site was verified    Location:  Elbow  Site:  R elbow  Prep: patient was prepped and draped in usual sterile fashion    Needle size:  25 G  Medications:  40 mg triamcinolone acetonide 40 mg/mL

## 2024-07-26 ENCOUNTER — TELEPHONE (OUTPATIENT)
Dept: HEMATOLOGY/ONCOLOGY | Facility: CLINIC | Age: 44
End: 2024-07-26
Payer: COMMERCIAL

## 2024-07-26 NOTE — TELEPHONE ENCOUNTER
Left message in reference to Hematology referral from Dr. Aaliyah Medrano.  [facility] message sent.

## 2024-08-06 ENCOUNTER — TELEPHONE (OUTPATIENT)
Dept: HEMATOLOGY/ONCOLOGY | Facility: CLINIC | Age: 44
End: 2024-08-06
Payer: COMMERCIAL

## 2024-08-06 DIAGNOSIS — D50.9 IDA (IRON DEFICIENCY ANEMIA): Primary | ICD-10-CM

## 2024-08-13 ENCOUNTER — TELEPHONE (OUTPATIENT)
Dept: PHARMACY | Facility: CLINIC | Age: 44
End: 2024-08-13
Payer: COMMERCIAL

## 2024-08-19 ENCOUNTER — TELEPHONE (OUTPATIENT)
Dept: ORTHOPEDICS | Facility: CLINIC | Age: 44
End: 2024-08-19
Payer: COMMERCIAL

## 2024-08-19 NOTE — TELEPHONE ENCOUNTER
Attempted to return the patients phone call there was no answer so I left a voicemail.       ----- Message from Devendra Fuller sent at 8/19/2024  8:06 AM CDT -----  Type:  Same Day Appointment Request    Caller is requesting a same day appointment.  Caller declined first available appointment listed below.    Name of Caller: pt   When is the first available appointment? N/A  Symptoms: injection in right elbow  Best Call Back Number: [phone]  Additional Information:  pt said she is only available this afternoon; reason for appt is because of pain

## 2024-08-20 ENCOUNTER — TELEPHONE (OUTPATIENT)
Dept: ORTHOPEDICS | Facility: CLINIC | Age: 44
End: 2024-08-20
Payer: COMMERCIAL

## 2024-08-20 NOTE — TELEPHONE ENCOUNTER
Spoke to the patient an was able to add her to 9/4/2024 for an appointment an also added her to the wait list as well the patient verbalized understanding       ----- Message from Devendra Fuller sent at 8/20/2024  2:43 PM CDT -----  Type:  Patient Returning Call    Who Called: pt  Who Left Message for Patient: Luly Winn MA  Does the patient know what this is regarding?: yes  Would the patient rather a call back or a response via [facility]? call  Best Call Back Number:  [phone]  Additional Information:  pt was offered an appt for next week; pt said she can either come in on 8/28/24 or 8/29/24 at the earliest or lastest time available

## 2024-09-03 ENCOUNTER — OFFICE VISIT (OUTPATIENT)
Dept: SPORTS MEDICINE | Facility: CLINIC | Age: 44
End: 2024-09-03
Payer: COMMERCIAL

## 2024-09-03 VITALS — HEIGHT: 65 IN | BODY MASS INDEX: 31.36 KG/M2 | WEIGHT: 188.25 LBS

## 2024-09-03 DIAGNOSIS — M77.11 LATERAL EPICONDYLITIS OF RIGHT ELBOW: Primary | ICD-10-CM

## 2024-09-03 PROCEDURE — 99999 PR PBB SHADOW E&M-EST. PATIENT-LVL III: CPT | Mod: PBBFAC,,, | Performed by: STUDENT IN AN ORGANIZED HEALTH CARE EDUCATION/TRAINING PROGRAM

## 2024-09-03 NOTE — PROGRESS NOTES
Patient ID: Sharon Hutton  YOB: 1980  MRN: [id number]    Chief Complaint: Pain of the Right Elbow    Referred By: Self via Dr Cooper for TENJET consult    History of Present Illness: Sharon Hutton is a right-hand dominant 44 y.o. female who presents today with lateral elbow pain for over 1.5 years.     Occupation: desk work- lots of typing     44-year-old female presenting today for right lateral elbow pain.  Has had pain on and off for about a year and a half multiple injections with good relief following each injection with our hand team but continues to have symptoms on a daily basis specifically with typing holding objects lifting and twisting.  Denies any injury fall trauma.  Pain mostly over the lateral elbow.  She is here for TENJET consult.    Past Medical History:   Past Medical History:   Diagnosis Date    Adult general medical examination 04/10/2017    Anemia     Biotin deficiency disease     Hyperlipidemia     Metabolic syndrome X     Vitamin D deficiency      Past Surgical History:   Procedure Laterality Date    BREAST BIOPSY Left 09/2020    HERNIA REPAIR      ROBOTIC HYSTERECTOMY, WITH SALPINGECTOMY  2016     Family History   Problem Relation Name Age of Onset    Diabetes Mother      Hypertension Mother      Asthma Sister       Social History     Socioeconomic History    Marital status:    Tobacco Use    Smoking status: Never    Smokeless tobacco: Never   Substance and Sexual Activity    Alcohol use: Yes     Social Determinants of Health     Financial Resource Strain: Low Risk  (9/3/2024)    Overall Financial Resource Strain (CARDIA)     Difficulty of Paying Living Expenses: Not hard at all   Food Insecurity: Patient Declined (9/3/2024)    Hunger Vital Sign     Worried About Running Out of Food in the Last Year: Patient declined     Ran Out of Food in the Last Year: Patient declined   Physical Activity: Insufficiently Active (9/3/2024)    Exercise Vital Sign     Days of  Exercise per Week: 3 days     Minutes of Exercise per Session: 30 min   Stress: Patient Declined (9/3/2024)    [employer] [place] [facility] [employer] - Occupational Stress Questionnaire     Feeling of Stress : Patient declined   Housing Stability: Unknown (9/3/2024)    Housing Stability Vital Sign     Unable to Pay for Housing in the Last Year: Patient declined     Medication List with Changes/Refills   Current Medications    ALPRAZOLAM (XANAX) 0.25 MG TABLET    Take 1 tablet (0.25 mg total) by mouth 2 (two) times daily as needed for Anxiety.    FERROUS SULFATE (FEOSOL) 325 MG (65 MG IRON) TAB TABLET    Take 1 tablet (325 mg total) by mouth daily with breakfast.    LACTOBACILLUS COMBINATION NO.9 (ADULT 50 PLUS PROBIOTIC) 4 BILLION CELL CAP    Take by mouth.    METFORMIN (GLUCOPHAGE-XR) 500 MG ER 24HR TABLET    Take 3 tablets (1,500 mg total) by mouth daily with breakfast.    TIRZEPATIDE, WEIGHT LOSS, (ZEPBOUND) 2.5 MG/0.5 ML PNIJ    Inject 2.5 mg into the skin every 7 days.     Review of patient's allergies indicates:   Allergen Reactions    Trulicity [dulaglutide]      Abdominal pain       Physical Exam:   Body mass index is 31.33 kg/m².    GENERAL: Well appearing, in no acute distress.  HEAD: Normocephalic and atraumatic.  ENT: External ears and nose grossly normal.  EYES: EOMI bilaterally  PULMONARY: Respirations are grossly even and non-labored.  NEURO: Awake, alert, and oriented x 3.  SKIN: No obvious rashes appreciated.  PSYCH: Mood & affect are appropriate.    Detailed MSK exam:       Tenderness over the lateral epicondylitis and common extensor tendon origin.  Pain with wrist extension 3rd digit extension resisted supination.  Motor function median ulnar radial nerve all intact 2+ radial pulse    Imaging:  X-Ray Elbow Complete Right  Narrative: EXAM: XR ELBOW COMPLETE 3 VIEW RIGHT    CLINICAL INDICATION:   Pain in right elbow    FINDINGS:  No comparison studies are available.  6 total images of the right  elbow were submitted for interpretation.  Negative for elbow joint effusion.    Alignment is satisfactory. No     fractures, dislocations, or erosive arthritic change.  Negative for radiopaque foreign bodies or air in the soft tissues.  Impression: 1.  Negative for acute process involving the right elbow.    Finalized on: 5/2/2023 9:37 AM By:  Spenser Edwards MD  [facility]# [id number]      2023-05-02 09:[id number]    [facility]    FOCUSED:  1. Diagnostic Extremity - MSK-Sports Ultrasound was recommended due to  right lateral elbow pain.  2. Diagnostic Extremity - MSK-Sports Ultrasound Performed: Luis Antonio Multani Extremity Study: right  common extensor tendon.    TECHNIQUE: Real time ultrasound examination of the right  common extensor was performed with SonoSite Edge 2, 9-L MHz linear probe(s).     FINDINGS: The images are of adequate diagnostic quality with identification of all echogenic structures made except for the vascular structures unless otherwise noted. There is no sonographic evidence of periosteal abnormalities, soft tissue edema, musculotendinous or nerve irregularities.  The right common extensor tendon was visualized in long and short axis.  There were multiple foci of small diffuse calcium deposits consistent with previous corticosteroid injections as well as tendon swelling at the ECRB with hypoechoic changes consistent with tendinosis.  There is no overriding hyperemia no large partial tears appreciated. The rest of the sonographic examination was unremarkable.  Ultrasound images were directly reviewed with the patient and then a treatment plan was discussed.  The images were saved and stored for documentation in the EMR.     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  This was discussed with the patient and / or family today.     Assessment:  Sharon Hutton is a 44 y.o. female  presents today for 1 and  half years of right lateral pain consistent with common extensor tendinosis.  We discussed the diagnosis prognosis as well as conservative treatment options moving forward.  I discussed failure of most of these most recent options as well as repeat corticosteroids and discussed moving forward with minimally invasive tendon debridement.  She is open to this we discussed the procedure in length as well as pre intra and postprocedural expectations.  She would like to move forward with this procedure and will schedule her for the next available.  Follow-up TENJET right.      Lateral epicondylitis of right elbow  -     Sports Medicine US - Extremity Non-Vascular LIMITED Right  -     Tenotomy elbow; Future         A copy of today's visit note has been sent to the referring provider.       Luis Antonio Multani MD    Disclaimer: This note was prepared using a voice recognition system and is likely to have sound alike errors within the text.

## 2024-09-10 ENCOUNTER — PATIENT MESSAGE (OUTPATIENT)
Dept: SPORTS MEDICINE | Facility: CLINIC | Age: 44
End: 2024-09-10
Payer: COMMERCIAL

## 2024-09-10 ENCOUNTER — TELEPHONE (OUTPATIENT)
Dept: SPORTS MEDICINE | Facility: CLINIC | Age: 44
End: 2024-09-10
Payer: COMMERCIAL

## 2024-09-10 NOTE — TELEPHONE ENCOUNTER
"----- Message from Devendra Fuller sent at 9/10/2024 10:10 AM CDT -----  Pt Requesting Call Back    Who called: pt  Who called for pt:  Best call back #:  [phone] or by portal  Add notes: pt said she wants to know what is her "down time" following her 8/25/24 surgery  "

## 2024-09-16 ENCOUNTER — TELEPHONE (OUTPATIENT)
Dept: HEMATOLOGY/ONCOLOGY | Facility: CLINIC | Age: 44
End: 2024-09-16
Payer: COMMERCIAL

## 2024-09-16 NOTE — TELEPHONE ENCOUNTER
----- Message from Gloria Horne LPN sent at 8/6/2024  2:09 PM CDT -----  F/u on labs from [facility] appt 9/25

## 2024-09-25 ENCOUNTER — TELEPHONE (OUTPATIENT)
Dept: HEMATOLOGY/ONCOLOGY | Facility: CLINIC | Age: 44
End: 2024-09-25
Payer: COMMERCIAL

## 2024-09-25 ENCOUNTER — PATIENT MESSAGE (OUTPATIENT)
Dept: HEMATOLOGY/ONCOLOGY | Facility: CLINIC | Age: 44
End: 2024-09-25
Payer: COMMERCIAL

## 2024-09-25 ENCOUNTER — PROCEDURE VISIT (OUTPATIENT)
Dept: SPORTS MEDICINE | Facility: CLINIC | Age: 44
End: 2024-09-25
Payer: COMMERCIAL

## 2024-09-25 ENCOUNTER — RESEARCH ENCOUNTER (OUTPATIENT)
Dept: RESEARCH | Facility: HOSPITAL | Age: 44
End: 2024-09-25

## 2024-09-25 DIAGNOSIS — M77.11 LATERAL EPICONDYLITIS OF RIGHT ELBOW: Primary | ICD-10-CM

## 2024-09-25 PROCEDURE — 76942 ECHO GUIDE FOR BIOPSY: CPT | Mod: S$GLB,,, | Performed by: STUDENT IN AN ORGANIZED HEALTH CARE EDUCATION/TRAINING PROGRAM

## 2024-09-25 PROCEDURE — 99499 UNLISTED E&M SERVICE: CPT | Mod: S$GLB,,, | Performed by: STUDENT IN AN ORGANIZED HEALTH CARE EDUCATION/TRAINING PROGRAM

## 2024-09-25 PROCEDURE — 24357 REPAIR ELBOW PERC: CPT | Mod: RT,S$GLB,, | Performed by: STUDENT IN AN ORGANIZED HEALTH CARE EDUCATION/TRAINING PROGRAM

## 2024-09-25 RX ORDER — TRAMADOL HYDROCHLORIDE 50 MG/1
50 TABLET ORAL EVERY 6 HOURS
Status: CANCELLED | OUTPATIENT
Start: 2024-09-25

## 2024-09-25 RX ORDER — TRAMADOL HYDROCHLORIDE 50 MG/1
50 TABLET ORAL EVERY 6 HOURS
Qty: 14 TABLET | Refills: 0 | Status: SHIPPED | OUTPATIENT
Start: 2024-09-25

## 2024-09-25 NOTE — PROGRESS NOTES
PROTOCOL: [employer]  SUBJECT  NUMBER: [id number]      Visit: Screening  Investigator: Dr. Luis Antonio Multani     Informed Consent:      Consenting was completed in private area using the most current IRB approved version of the main Informed Consent Form (ICF) by myself. The patient was given ample time to review the consents prior to execution of the main ICF.     Prior to the ICF being signed, or any study protocol required data collection, testing, procedure, or intervention being performed, the following was done and/or discussed:?   Patient was given a copy of the ICF for review: yes?   Purpose of the study and qualifications to participate: yes???   Study design, follow up schedule, and tests or procedures done at each visit: yes??   Confidentiality and HIPAA Authorization for Release of Medical Records for the research trial/ subject's rights/research related injury: yes??   Risk, Benefits, Alternative Treatments, Compensation and Costs: yes??   Participation in the research trial is voluntary and patient may withdraw at anytime: yes??   Contact information for study related questions: yes??      Patient verbalizes understanding of the above:  yes?   Contact information for CRC and PI given to patient: yes?   Patient able to adequately summarize: the purpose of the study, the risks associated with the study, and all procedures, testing, and follow-ups associated with the study:  yes?      Sharon Anni signed the IRB approved version of the main ICF.? All pages of the consents were reviewed with the patient, and all questions answered satisfactorily. The patient signed the paper consent form.      Patient received a fully executed copy of same (copy of informed consent made and provided to patient). The original consents were scanned into electronic medical records (EPIC).     Time of Consent Execution: 9:10am     Screening was registered in [facility]     Physician assessments completed? Yes     Patient  Assessments completed? Yes               Concomitant medications and medical history listed in the patient's electronic record were reviewed with the patient to ensure accuracy.     Tenjet procedure was conducted same day as screening. Procedure was done on right elbow (lateral epicondylitis). Please see Dr. Multani's note for procedure details.      End of Visit:     Patient was instructed that she would complete all her follow-up questionnaires via a survey link sent to her through [facility]. Patient verbalized understanding, and has all of our contact numbers should she need to get in touch with us before that time.

## 2024-09-25 NOTE — TELEPHONE ENCOUNTER
Nurse placed a call to to patient regarding missed virtual appointment scheduled with Dr. Boles at 3:00 pm today. Patient logged in at 3:59 pm. Voicemail left to reschedule appointment.

## 2024-09-25 NOTE — PROCEDURES
TENJET Operative Note:    PATIENT NAME: Sharon Hutton    MEDICAL RECORD NUMBER: [id number]    AGE: 44 y.o.    INFORMED CONSENT: I verify that I personally obtained Sharon Hutton's consent which was signed and uploaded into [employer].     TIMEOUT: Audible timeout was performed at  9:50 a.m..   At this time, the team confirmed the correct patient, correct procedure and correct site with laterality. The patient's allergies were reviewed. The procedure site was marked. The procedure team checked for proper functioning of devices and supplies to be used for the procedure. The procedure team reviewed the relevant diagnostic tests pertinent to the procedure.  Confirmed by SMA Linda Kennedy.    PHYSICIAN: Luis Antonio Multani    LOCATION: [facility], [facility].      PROCEDURE: TenJet Tenotomy procedure for  right common extensor tendinosis    ANESTHESIA: local    PREOPERATIVE DIAGNOSIS:  right common extensor tendinosis    POSTOPERATIVE DIAGNOSIS:  right common extensor tendinosis with interstitial split tear    PROCEDURE DESCRIPTION:    The treatment area was cleaned and draped in sterile fashion. Using sterile technique, a [employer] ultrasound laptop unit with a variable frequency (13.0-6.0 MHz) linear transducer was used to successfully localize the area of pathology to be treated today. A mayela with a sterile marker was placed, on the skin, at the area of maximum tenderness. Then the treatment area was cleaned and draped in sterile fashion. A 22 gauge needle was visualized under ultrasound administering anesthetic lidocaine, to locally anesthetize the treatment area. A separate 25 gauge needle was then utilized to create a skin wheel using 1% lidocaine with epinephrine 1.5cm from the treatment area. An 11 gauge scalpel was used to make a small puncture incision in the area of the skin wheel, and ultrasound was utilized to visualize the scalpel at the target area. The 3 inch tenotomy  needle was inserted into the pathologic tissue under direct ultrasound guidance, and tenotomy was performed with degenerative tendinotic tissue removed. Upon completion, gentle compression was applied to the site with sterile gauze. Adhesive strips, occlusive dressing, and compression bandage were then applied.    Patient left the procedure room in satisfactory condition having tolerated the procedure well.    Volume:  1 L    ESTIMATED BLOOD LOSS: <5 ml    COMPLICATIONS: none    POSTOPERATIVE PLAN:   As indicated in the AVS given to the patient today post procedure.      Patient voiced understanding of these instructions.    Luis Antonio Multani

## 2024-09-25 NOTE — PATIENT INSTRUCTIONS
Assessment:  Sharon Hutton is a 44 y.o. female No chief complaint on file.      Encounter Diagnosis   Name Primary?    Lateral epicondylitis of right elbow         Plan:  Performed percutaneous tenotomy (TENJET) procedure in office today.   Provided proper education regarding in-procedure expectations and post-procedure expectations.   At least 15 minutes were spent sizing, fitting, and educating regarding durable medical equipment today and all questions answered. This service was performed by Linda Kennedy Sports Medicine Assistant under direction of Luis Antonio Multani MD today.  CPT 10827.  At least 15 minutes were spent developing, teaching, and performing a home exercise program.  A written summary was provided and all questions were answered. This service was performed by Linda Kennedy Sports Medicine Assistant under direction of Luis Antonio Multani MD. CPT 76387-TD  An ambulatory referral to physical therapy [email or web address] today. You should expect a phone call within the next few days from Centralized Scheduling. If you do not hear lfrom them, please reach out to the PT department directly at [phone].        TENJET POST-PROCEDURE INSTRUCTIONS        1. WOUND CARE   - Keep bandages and procedure area clean and dry for 5 days   - Avoid submerging area in water for 5 days   - You did not have any sutures or stitches placed. Steri-strips were placed over the wound. These will fall off in the shower after about one week. If they have not fallen off after the first 9 days, you can remove them yourself.        CONTACT OUR OFFICE IF:     The area become red or hot to touch     You have a fever of 100° or more (but do not wait for a response, seek immediate care)     You have increased pain or swelling     You have drainage from the treatment site     Best way to connect would be via [employer] or call [facility] at [phone]. If unable to connect, please proceed to the nearest Emergency Care Center.        2. POST-PROCEDURE PAIN CONTROL   - You may apply heat for 20 minutes as needed for discomfort   - Pain control:  Tylenol (acetaminophen), hot pack application, Tramadol as needed   - Please refrain from using anti-inflammatory medication as this can react negatively with the goal of the procedure.        3. WEEK BY WEEK SCHEDULE     Weeks 1-2     Immobilization: braces (only for one week)     Lifting restriction: Typically, no more then 2-3 lbs (can of peas) for the first 2-3 weeks     Activity/work limitations: No overuse/repetitive activity     Week 3-5     Therapy: Start at the beginning of week 3     Immobilization: none     Return to work/activity: Per guidance of Physical therapist     Week 6-8     Follow-up in the office with Dr. Multani     Week 7-12    Progress with your training or return to work     Typically, full results are noted at 3 months and beyond     Week 12     Follow-up with Dr. Multani           Follow-up: 6 weeks or sooner if there are any problems between now and then.    Thank you for choosing [facility] and Dr. Luis Antonio Multani for your orthopedic & sports medicine care. It is our goal to provide you with exceptional care that will help keep you healthy, active, and get you back in the game.    Please do not hesitate to reach out to us via email, phone, or MyChart with any questions, concerns, or feedback.    If you felt that you received exemplary care today, please consider leaving us feedback on [employer] at:  [email or web address]    If you are experiencing pain/discomfort ,or have questions and would like to be connected to the [facility] on-call team, please call this number and specify which Sports Medicine provider is treating you: [phone]

## 2024-09-27 ENCOUNTER — PATIENT MESSAGE (OUTPATIENT)
Dept: SPORTS MEDICINE | Facility: CLINIC | Age: 44
End: 2024-09-27
Payer: COMMERCIAL

## 2024-10-07 ENCOUNTER — LAB VISIT (OUTPATIENT)
Dept: LAB | Facility: HOSPITAL | Age: 44
End: 2024-10-07
Attending: NURSE PRACTITIONER
Payer: COMMERCIAL

## 2024-10-07 DIAGNOSIS — D64.9 ANEMIA, UNSPECIFIED TYPE: ICD-10-CM

## 2024-10-07 DIAGNOSIS — D64.9 ANEMIA, UNSPECIFIED TYPE: Primary | ICD-10-CM

## 2024-10-07 LAB
ALBUMIN SERPL BCP-MCNC: 3.3 G/DL (ref 3.5–5.2)
ALP SERPL-CCNC: 62 U/L (ref 55–135)
ALT SERPL W/O P-5'-P-CCNC: 7 U/L (ref 10–44)
ANION GAP SERPL CALC-SCNC: 13 MMOL/L (ref 8–16)
AST SERPL-CCNC: 14 U/L (ref 10–40)
BASOPHILS # BLD AUTO: 0.03 K/UL (ref 0–0.2)
BASOPHILS NFR BLD: 0.6 % (ref 0–1.9)
BILIRUB SERPL-MCNC: 0.2 MG/DL (ref 0.1–1)
BUN SERPL-MCNC: 11 MG/DL (ref 6–20)
CALCIUM SERPL-MCNC: 9.5 MG/DL (ref 8.7–10.5)
CHLORIDE SERPL-SCNC: 101 MMOL/L (ref 95–110)
CO2 SERPL-SCNC: 22 MMOL/L (ref 23–29)
CREAT SERPL-MCNC: 0.8 MG/DL (ref 0.5–1.4)
DIFFERENTIAL METHOD BLD: ABNORMAL
EOSINOPHIL # BLD AUTO: 0.1 K/UL (ref 0–0.5)
EOSINOPHIL NFR BLD: 2.2 % (ref 0–8)
ERYTHROCYTE [DISTWIDTH] IN BLOOD BY AUTOMATED COUNT: 14.7 % (ref 11.5–14.5)
EST. GFR  (NO RACE VARIABLE): >60 ML/MIN/1.73 M^2
GLUCOSE SERPL-MCNC: 83 MG/DL (ref 70–110)
HCT VFR BLD AUTO: 33.4 % (ref 37–48.5)
HGB BLD-MCNC: 11 G/DL (ref 12–16)
IMM GRANULOCYTES # BLD AUTO: 0.01 K/UL (ref 0–0.04)
IMM GRANULOCYTES NFR BLD AUTO: 0.2 % (ref 0–0.5)
LDH SERPL L TO P-CCNC: 93 U/L (ref 110–260)
LYMPHOCYTES # BLD AUTO: 1.3 K/UL (ref 1–4.8)
LYMPHOCYTES NFR BLD: 24.5 % (ref 18–48)
MCH RBC QN AUTO: 27.6 PG (ref 27–31)
MCHC RBC AUTO-ENTMCNC: 32.9 G/DL (ref 32–36)
MCV RBC AUTO: 84 FL (ref 82–98)
MONOCYTES # BLD AUTO: 0.4 K/UL (ref 0.3–1)
MONOCYTES NFR BLD: 6.6 % (ref 4–15)
NEUTROPHILS # BLD AUTO: 3.6 K/UL (ref 1.8–7.7)
NEUTROPHILS NFR BLD: 65.9 % (ref 38–73)
NRBC BLD-RTO: 0 /100 WBC
PLATELET # BLD AUTO: 399 K/UL (ref 150–450)
PMV BLD AUTO: 8.9 FL (ref 9.2–12.9)
POTASSIUM SERPL-SCNC: 3.5 MMOL/L (ref 3.5–5.1)
PROT SERPL-MCNC: 7.9 G/DL (ref 6–8.4)
RBC # BLD AUTO: 3.99 M/UL (ref 4–5.4)
RETICS/RBC NFR AUTO: 1.2 % (ref 0.5–2.5)
SODIUM SERPL-SCNC: 136 MMOL/L (ref 136–145)
WBC # BLD AUTO: 5.42 K/UL (ref 3.9–12.7)

## 2024-10-07 PROCEDURE — 83010 ASSAY OF HAPTOGLOBIN QUANT: CPT | Performed by: NURSE PRACTITIONER

## 2024-10-07 PROCEDURE — 80053 COMPREHEN METABOLIC PANEL: CPT | Performed by: NURSE PRACTITIONER

## 2024-10-07 PROCEDURE — 82746 ASSAY OF FOLIC ACID SERUM: CPT | Performed by: NURSE PRACTITIONER

## 2024-10-07 PROCEDURE — 85045 AUTOMATED RETICULOCYTE COUNT: CPT | Performed by: NURSE PRACTITIONER

## 2024-10-07 PROCEDURE — 83540 ASSAY OF IRON: CPT | Performed by: NURSE PRACTITIONER

## 2024-10-07 PROCEDURE — 85025 COMPLETE CBC W/AUTO DIFF WBC: CPT | Performed by: NURSE PRACTITIONER

## 2024-10-07 PROCEDURE — 84165 PROTEIN E-PHORESIS SERUM: CPT | Performed by: NURSE PRACTITIONER

## 2024-10-07 PROCEDURE — 82607 VITAMIN B-12: CPT | Performed by: NURSE PRACTITIONER

## 2024-10-07 PROCEDURE — 86334 IMMUNOFIX E-PHORESIS SERUM: CPT | Performed by: NURSE PRACTITIONER

## 2024-10-07 PROCEDURE — 83615 LACTATE (LD) (LDH) ENZYME: CPT | Performed by: NURSE PRACTITIONER

## 2024-10-07 PROCEDURE — 36415 COLL VENOUS BLD VENIPUNCTURE: CPT | Performed by: NURSE PRACTITIONER

## 2024-10-07 PROCEDURE — 83521 IG LIGHT CHAINS FREE EACH: CPT | Performed by: NURSE PRACTITIONER

## 2024-10-08 LAB
FOLATE SERPL-MCNC: 5.4 NG/ML (ref 4–24)
HAPTOGLOB SERPL-MCNC: 241 MG/DL (ref 30–250)
IRON SERPL-MCNC: 63 UG/DL (ref 30–160)
PATH REV BLD -IMP: NORMAL
PATH REV BLD -IMP: NORMAL
SATURATED IRON: 14 % (ref 20–50)
TOTAL IRON BINDING CAPACITY: 437 UG/DL (ref 250–450)
TRANSFERRIN SERPL-MCNC: 295 MG/DL (ref 200–375)
VIT B12 SERPL-MCNC: 257 PG/ML (ref 210–950)

## 2024-10-09 ENCOUNTER — OFFICE VISIT (OUTPATIENT)
Dept: HEMATOLOGY/ONCOLOGY | Facility: CLINIC | Age: 44
End: 2024-10-09
Payer: COMMERCIAL

## 2024-10-09 ENCOUNTER — CLINICAL SUPPORT (OUTPATIENT)
Facility: HOSPITAL | Age: 44
End: 2024-10-09
Attending: STUDENT IN AN ORGANIZED HEALTH CARE EDUCATION/TRAINING PROGRAM
Payer: COMMERCIAL

## 2024-10-09 DIAGNOSIS — M25.621 DECREASED RANGE OF MOTION OF RIGHT ELBOW: Primary | ICD-10-CM

## 2024-10-09 DIAGNOSIS — D51.9 ANEMIA DUE TO VITAMIN B12 DEFICIENCY, UNSPECIFIED B12 DEFICIENCY TYPE: ICD-10-CM

## 2024-10-09 DIAGNOSIS — D50.9 IRON DEFICIENCY ANEMIA, UNSPECIFIED IRON DEFICIENCY ANEMIA TYPE: ICD-10-CM

## 2024-10-09 DIAGNOSIS — M77.11 LATERAL EPICONDYLITIS OF RIGHT ELBOW: ICD-10-CM

## 2024-10-09 DIAGNOSIS — D64.9 NORMOCYTIC ANEMIA: Primary | ICD-10-CM

## 2024-10-09 DIAGNOSIS — R29.898 WRIST WEAKNESS: ICD-10-CM

## 2024-10-09 DIAGNOSIS — R76.8 ELEVATED SERUM IMMUNOGLOBULIN FREE LIGHT CHAIN LEVEL: ICD-10-CM

## 2024-10-09 LAB
ALBUMIN SERPL ELPH-MCNC: 3.54 G/DL (ref 3.35–5.55)
ALPHA1 GLOB SERPL ELPH-MCNC: 0.34 G/DL (ref 0.17–0.41)
ALPHA2 GLOB SERPL ELPH-MCNC: 0.9 G/DL (ref 0.43–0.99)
B-GLOBULIN SERPL ELPH-MCNC: 0.97 G/DL (ref 0.5–1.1)
GAMMA GLOB SERPL ELPH-MCNC: 1.65 G/DL (ref 0.67–1.58)
INTERPRETATION SERPL IFE-IMP: NORMAL
KAPPA LC SER QL IA: 2.67 MG/DL (ref 0.33–1.94)
KAPPA LC/LAMBDA SER IA: 1.72 (ref 0.26–1.65)
LAMBDA LC SER QL IA: 1.55 MG/DL (ref 0.57–2.63)
PROT SERPL-MCNC: 7.4 G/DL (ref 6–8.4)

## 2024-10-09 PROCEDURE — 97140 MANUAL THERAPY 1/> REGIONS: CPT | Mod: PN | Performed by: PHYSICAL THERAPIST

## 2024-10-09 PROCEDURE — 3044F HG A1C LEVEL LT 7.0%: CPT | Mod: CPTII,95,, | Performed by: NURSE PRACTITIONER

## 2024-10-09 PROCEDURE — 1160F RVW MEDS BY RX/DR IN RCRD: CPT | Mod: CPTII,95,, | Performed by: NURSE PRACTITIONER

## 2024-10-09 PROCEDURE — 97161 PT EVAL LOW COMPLEX 20 MIN: CPT | Mod: PN | Performed by: PHYSICAL THERAPIST

## 2024-10-09 PROCEDURE — G2211 COMPLEX E/M VISIT ADD ON: HCPCS | Mod: 95,,, | Performed by: NURSE PRACTITIONER

## 2024-10-09 PROCEDURE — 1159F MED LIST DOCD IN RCRD: CPT | Mod: CPTII,95,, | Performed by: NURSE PRACTITIONER

## 2024-10-09 PROCEDURE — 99215 OFFICE O/P EST HI 40 MIN: CPT | Mod: 95,,, | Performed by: NURSE PRACTITIONER

## 2024-10-09 PROCEDURE — 97110 THERAPEUTIC EXERCISES: CPT | Mod: PN | Performed by: PHYSICAL THERAPIST

## 2024-10-09 RX ORDER — EPINEPHRINE 0.3 MG/.3ML
0.3 INJECTION SUBCUTANEOUS ONCE AS NEEDED
OUTPATIENT
Start: 2024-10-09

## 2024-10-09 RX ORDER — SODIUM CHLORIDE 0.9 % (FLUSH) 0.9 %
10 SYRINGE (ML) INJECTION
OUTPATIENT
Start: 2024-10-09

## 2024-10-09 RX ORDER — DIPHENHYDRAMINE HYDROCHLORIDE 50 MG/ML
50 INJECTION INTRAMUSCULAR; INTRAVENOUS ONCE AS NEEDED
OUTPATIENT
Start: 2024-10-09

## 2024-10-09 NOTE — PROGRESS NOTES
See plan of care for initial evaluation.        Elfego Dockery, PT, DPT  Physical Therapist  Board-Certified Specialist in Orthopaedic and Sports Physical Therapy  10/9/2024

## 2024-10-09 NOTE — PLAN OF CARE
[facility]   Physical Therapy Initial Evaluation      Date: 10/9/2024   Name: Sharon Hutton  Clinic Number: [id number]    Therapy Diagnosis:   Encounter Diagnoses   Name Primary?    Lateral epicondylitis of right elbow     Decreased range of motion of right elbow Yes    Wrist weakness       Physician: Luis Antonio Multani MD     Physician Orders: PT Eval and Treat  Medical Diagnosis from Referral: Lateral epicondylitis of right elbow [M77.11]   Evaluation Date: 10/9/2024  Authorization Period Expiration: 9/25/25  Plan of Care Expiration: 12/9/2024  Progress Note Due: 11/9/2024  Visit # / Visits authorized: 1/1   FOTO: 1/3 (last performed on 10/9/2024)    Precautions: Standard    Time In: 12:50 AM  Time Out: 1:50 AM  Total Billable Time (timed & untimed codes): 60 minutes    Subjective     Date of onset: chronic    History of current condition - SHAORN reports she had been dealing with elbow pain for a long time and has had several steroid injections into her elbow that provided short term relief. She reports she has been feeling okay since the procedure and returned to work earlier this week.    Pain:  Current 0/10, worst 10/10, best 0/10   Location: [x] Right   [] Left:  elbow  Description: aching, stiff, tight  Aggravating Factors: fully bending elbow  Easing Factors: activity avoidance, rest    Prior Therapy:   [x] N/A    [] Yes:   Social History: Pt lives with their family  Occupation: Pt is  for  office  Prior Level of Function: Independent and pain free with all ADL, IADL, community mobility and functional activities.   Current Level of Function: Independent with all ADL, IADL, community mobility and functional activities with reports of increased pain and need for increased time and frequent breaks.     Dominant Extremity:    [x] Right    [] Left    Pts goals: Pt reported goals are to decrease overall pain levels in order to return to prior functional  level.    Medical History:   Past Medical History:   Diagnosis Date    Adult general medical examination 04/10/2017    Anemia     Biotin deficiency disease     Hyperlipidemia     Metabolic syndrome X     Vitamin D deficiency        Surgical History:   Sharon Hutton  has a past surgical history that includes Hernia repair; robotic hysterectomy, with salpingectomy (2016); and Breast biopsy (Left, 09/2020).    Medications:   Sharon has a current medication list which includes the following prescription(s): alprazolam, adult 50 plus probiotic, metformin, and tramadol.    Allergies:   Review of patient's allergies indicates:   Allergen Reactions    Trulicity [dulaglutide]      Abdominal pain        Objective              Right Left    Strength NT lbs NT lbs   Elbow flexion (AROM/PROM) 20 degree flexion restriction    10 degree extension restriction WNL   Wrist Flexion 4/5; painful 4+/5   Wrist Extension 4/5; painful 4+/5   Pronation 4+/5 5/5   Supination 4/5; painful 5/5         Palpation Elbow:  TTP al lateral elbow joint line    Palpation Shoulder:  No TTP        Function:     CMS Impairment/Limitation/Restriction for FOTO Elbow Survey    Therapist reviewed FOTO scores for SHARON on 10/9/2024.   FOTO documents entered into EPIC - see Media section.    Functional Score: 48%         Treatment     Total Treatment time (time-based codes) separate from Evaluation: (25) minutes     SHARON received the treatments listed below:    Intervention Code  (see below chart) Date/Notes  10/9/24   Manual Therapy MT PROM and stretching to improve R elbow flexion and extension     Mason manipulation   LLLD Elbow extension stretch TE Red band; 2 minutes   CKC Shoulder to hand elbow flexion stretch in seated TE 2 x 1 minute   Wrist extension isometrics TE 2 x 1 minute ; 1 #     10 minutes of Therapeutic Exercise (TE) to develop strength, endurance, ROM, and flexibility. (99672)  15 minutes of Manual therapy (MT) to improve pain and ROM.  (00162)  0 minutes of Neuromuscular Re-Education (NR)  to improve: Balance, Coordination, Kinesthetic, Sense, Proprioception, and Posture. (98230)  0 minutes of Therapeutic Activities (TA) to improve functional performance. (37659)  Unattended Electrical Stimulation (ES) for muscle performance and/or pain modulation. (88014)  Vasopneumatic Device Therapy () for management of swelling/edema. (42394)  BFR: Blood flow restriction applied during exercise  NP: Not Performed      Patient Education and Home Exercises     Education provided:  PURPOSE: Patient educated on the impairments noted above and the effects of physical therapy intervention to improve overall condition and QOL.   EXERCISE: Patient was educated on all the above exercise prior/during/after for proper posture, positioning, and execution for safe performance with home exercise program.   STRENGTH: Patient educated on the importance of improved core and extremity strength in order to improve alignment of the spine and extremities with static positions and dynamic movement.     Written Home Exercises Provided: yes.  Exercises were reviewed and SHARON was able to demonstrate them prior to the end of the session.  SHARON demonstrated good  understanding of the education provided. See EMR under Patient Instructions for exercises provided during therapy sessions.    Assessment     Sharon is a 44 y.o. female referred to outpatient Physical Therapy with a medical diagnosis of Lateral epicondylitis of right elbow [M77.11] . Pt presents with impairments in the following categories: IMPAIRMENTS: ROM, strength, and joint mobility. Prioritize restoration of elbow range of motion to reach full flexion and extension.    Pt prognosis is Good  Pt will benefit from skilled outpatient Physical Therapy to address the deficits stated above and in the chart below, provide pt/family education, and to maximize pt's level of independence.     Plan of care discussed with patient:  "Yes  Pt's spiritual, cultural and educational needs considered and patient is agreeable to the plan of care and goals as stated below:     Anticipated Barriers for therapy: chronicity of condition    Medical Necessity is demonstrated by the following  History  Co-morbidities and personal factors that may impact the plan of care [x] LOW: no personal factors / co-morbidities  [] MODERATE: 1-2 personal factors / co-morbidities  [] HIGH: 3+ personal factors / co-morbidities    Moderate / High Support Documentation:   Past Medical History:   Diagnosis Date    Adult general medical examination 04/10/2017    Anemia     Biotin deficiency disease     Hyperlipidemia     Metabolic syndrome X     Vitamin D deficiency         Examination  Body Structures and Functions, activity limitations and participation restrictions that may impact the plan of care [x] LOW: addressing 1-2 elements  [] MODERATE: 3+ elements  [] HIGH: 4+ elements (please support below)    Moderate / High Support Documentation: See above in "Current Level of Function"      Clinical Presentation [x] LOW: stable  [] MODERATE: Evolving  [] HIGH: Unstable     Decision Making/ Complexity Score: low         Short Term Goals:  4 weeks Status  Date Met   PAIN: Pt will report worst pain of 4/10 in order to progress toward max functional ability and improve quality of life. [x] Progressing  [] Met  [] Not Met    FUNCTION: Patient will demonstrate improved function as indicated by a functional score of greater than or equal to 55 out of 100 on FOTO. [x] Progressing  [] Met  [] Not Met    MOBILITY: Patient will improve AROM to 50% of stated goals, listed in objective measures above, in order to progress towards independence with functional activities.  [x] Progressing  [] Met  [] Not Met    STRENGTH: Patient will improve strength to 50% of stated goals, listed in objective measures above, in order to progress towards independence with functional activities. [x] " Progressing  [] Met  [] Not Met    POSTURE: Patient will correct postural deviations in sitting and standing, to decrease pain and promote long term stability.  [x] Progressing  [] Met  [] Not Met      Long Term Goals:  8 weeks Status Date Met   PAIN: Pt will report worst pain of 2/10 in order to progress toward max functional ability and improve quality of life [x] Progressing  [] Met  [] Not Met    FUNCTION: Patient will demonstrate improved function as indicated by a functional score of greater than or equal to 75 out of 100 on FOTO. [x] Progressing  [] Met  [] Not Met    MOBILITY: Patient will improve AROM to stated goals, listed in objective measures above, in order to return to maximal functional potential and improve quality of life. Goal: full elbow ROM [x] Progressing  [] Met  [] Not Met    STRENGTH: Patient will improve strength to stated goals, listed in objective measures above, in order to improve functional independence and quality of life. Goal: 5/5 for all R elbow strength measures [x] Progressing  [] Met  [] Not Met      Plan     Plan of care Certification: 10/9/2024 to 12/9/24.    Outpatient Physical Therapy 2 times weekly for 5 weeks to include any combination of the following interventions: virtual visits, dry needling, modalities, electrical stimulation (IFC, Pre-Mod, Attended with Functional Dry Needling), Manual Therapy, Neuromuscular Re-ed, Patient Education, Self Care, Therapeutic Exercise, and Therapeutic Activites       Elfego Dockery, PT, DPT  Physical Therapist  Board-Certified Specialist in Orthopaedic and Sports Physical Therapy  10/9/2024      I CERTIFY THE NEED FOR THESE SERVICES FURNISHED UNDER THIS PLAN OF TREATMENT AND WHILE UNDER MY CARE   Physician's comments:     Physician's Signature: ___________________________________________________

## 2024-10-09 NOTE — PROGRESS NOTES
The patient location is: work  The chief complaint leading to consultation is: fatigue    Visit type: audiovisual    Face to Face time with patient: 45  60 minutes of total time spent on the encounter, which includes face to face time and non-face to face time preparing to see the patient (eg, review of tests), Obtaining and/or reviewing separately obtained history, Documenting clinical information in the electronic or other health record, Independently interpreting results (not separately reported) and communicating results to the patient/family/caregiver, or Care coordination (not separately reported).     Each patient to whom he or she provides medical services by telemedicine is:  (1) informed of the relationship between the physician and patient and the respective role of any other health care provider with respect to management of the patient; and (2) notified that he or she may decline to receive medical services by telemedicine and may withdraw from such care at any time.      Patient ID: Sharon Hutton is a 44 y.o. female.    Chief Complaint: fatigue    HPI:  43 yo female presents to the clinic as a new patient for further eval and recommendations.  Consulted for iron deficiency anemia by her pcp. Dr. Aaliyah Medrano.      Has partial hysterectomy in the past due to heavy menstrual cycles and fibroids in 2016.    States that she has chronic constipation and has not been able to tolerate oral iron due to increased constipation.      Has noted black stools in past - most recently earlier this week.  Has a history of bleeding hemorrhoids with last noted about a year ago.   Has acid reflux some times.  States that she takes chewable otc medication when needed.  Flares when eating greasy foods.      States that she has had anemia, Pretty much, all of her life.    Family hx of cancer:  Maternal grandfather: cancer unsure  Paternal grandfather: cancer unsure  Paternal aunt: cancer unsure    5/2024 mammogram -  negative for malignancy    Currently with hgb 11 g/dL normocytic, saturated iron 14%, b12 257.    [place] free light chain - 2.67, ratio 1.72    Denies known or personal family hx of sickle cell disease or sickle cell trait.    Denies h/o gastric bypass or gastric sleeve.     I have reviewed all of the patient's relevant lab work available in the medical record and have utilized this in my evaluation and management recommendations today.      Social History     Socioeconomic History    Marital status:    Tobacco Use    Smoking status: Never    Smokeless tobacco: Never   Substance and Sexual Activity    Alcohol use: Yes     Social Drivers of Health     Financial Resource Strain: Low Risk  (9/3/2024)    Overall Financial Resource Strain (CARDIA)     Difficulty of Paying Living Expenses: Not hard at all   Food Insecurity: Patient Declined (9/3/2024)    Hunger Vital Sign     Worried About Running Out of Food in the Last Year: Patient declined     Ran Out of Food in the Last Year: Patient declined   Physical Activity: Insufficiently Active (9/3/2024)    Exercise Vital Sign     Days of Exercise per Week: 3 days     Minutes of Exercise per Session: 30 min   Stress: Patient Declined (9/3/2024)    [employer] - Occupational Stress Questionnaire     Feeling of Stress : Patient declined   Housing Stability: Unknown (9/3/2024)    Housing Stability Vital Sign     Unable to Pay for Housing in the Last Year: Patient declined       Family History   Problem Relation Name Age of Onset    Diabetes Mother      Hypertension Mother      Asthma Sister         Past Surgical History:   Procedure Laterality Date    BREAST BIOPSY Left 09/2020    HERNIA REPAIR      ROBOTIC HYSTERECTOMY, WITH SALPINGECTOMY  2016       Past Medical History:   Diagnosis Date    Adult general medical examination 04/10/2017    Anemia     Biotin deficiency disease     Hyperlipidemia     Metabolic syndrome X     Vitamin D deficiency         Review of Systems   Constitutional:  Positive for fatigue. Negative for appetite change, chills, diaphoresis and fever.   HENT:  Negative for mouth sores and nosebleeds.    Eyes:  Negative for visual disturbance.   Respiratory:  Negative for cough and shortness of breath.    Cardiovascular:  Negative for chest pain and palpitations.   Gastrointestinal:  Positive for anal bleeding, blood in stool and constipation. Negative for abdominal pain, diarrhea, nausea and vomiting.   Endocrine: Positive for cold intolerance.   Genitourinary:  Negative for frequency, hematuria, menstrual problem and vaginal bleeding.   Musculoskeletal:  Negative for back pain.   Skin:  Negative for rash and wound.   Neurological:  Negative for dizziness, light-headedness and headaches.   Hematological:  Negative for adenopathy. Bruises/bleeds easily.   Psychiatric/Behavioral:  The patient is nervous/anxious.         Cloudy mind and irritable.  Cannot stay focused          Medication List with Changes/Refills   Current Medications    ALPRAZOLAM (XANAX) 0.25 MG TABLET    Take 1 tablet (0.25 mg total) by mouth 2 (two) times daily as needed for Anxiety.    LACTOBACILLUS COMBINATION NO.9 (ADULT 50 PLUS PROBIOTIC) 4 BILLION CELL CAP    Take by mouth.    METFORMIN (GLUCOPHAGE-XR) 500 MG ER 24HR TABLET    Take 3 tablets (1,500 mg total) by mouth daily with breakfast.    TRAMADOL (ULTRAM) 50 MG TABLET    Take 1 tablet (50 mg total) by mouth every 6 (six) hours.        Objective:   There were no vitals filed for this visit.    Physical Exam  Constitutional:       Appearance: Normal appearance.   Pulmonary:      Effort: Pulmonary effort is normal.   Neurological:      Mental Status: She is alert and oriented to person, place, and time.   Psychiatric:         Mood and Affect: Mood normal.         Behavior: Behavior normal.         Thought Content: Thought content normal.         Judgment: Judgment normal.         Assessment:     Problem List Items  Addressed This Visit       Iron deficiency anemia    Relevant Orders    Ambulatory referral/consult to Endo Procedure     CBC Auto Differential    Basic Metabolic Panel    Ferritin    Iron and TIBC     Other Visit Diagnoses       Normocytic anemia    -  Primary    Relevant Orders    CBC Auto Differential    Basic Metabolic Panel    Ferritin    Iron and TIBC    Elevated serum immunoglobulin free light chain level        Relevant Orders    CBC Auto Differential    Basic Metabolic Panel    Anemia due to vitamin B12 deficiency, unspecified B12 deficiency type        Relevant Orders    Ambulatory referral/consult to Endo Procedure     CBC Auto Differential    Basic Metabolic Panel    Vitamin B12            Lab Results   Component Value Date    WBC 5.42 10/07/2024    RBC 3.99 (L) 10/07/2024    HGB 11.0 (L) 10/07/2024    HCT 33.4 (L) 10/07/2024    MCV 84 10/07/2024    MCH 27.6 10/07/2024    MCHC 32.9 10/07/2024    RDW 14.7 (H) 10/07/2024     10/07/2024    MPV 8.9 (L) 10/07/2024    GRAN 3.6 10/07/2024    GRAN 65.9 10/07/2024    LYMPH 1.3 10/07/2024    LYMPH 24.5 10/07/2024    MONO 0.4 10/07/2024    MONO 6.6 10/07/2024    EOS 0.1 10/07/2024    BASO 0.03 10/07/2024    EOSINOPHIL 2.2 10/07/2024    BASOPHIL 0.6 10/07/2024      Lab Results   Component Value Date     10/07/2024    K 3.5 10/07/2024     10/07/2024    CO2 22 (L) 10/07/2024    BUN 11 10/07/2024    CREATININE 0.8 10/07/2024    CALCIUM 9.5 10/07/2024    ANIONGAP 13 10/07/2024    ESTGFRAFRICA 109 05/28/2020    EGFRNONAA 92 10/13/2020     Lab Results   Component Value Date    ALT 7 (L) 10/07/2024    AST 14 10/07/2024    ALKPHOS 62 10/07/2024    BILITOT 0.2 10/07/2024     Lab Results   Component Value Date    IRON 63 10/07/2024    TRANSFERRIN 295 10/07/2024    TIBC 437 10/07/2024    FESATURATED 14 (L) 10/07/2024    FERRITIN 23 09/18/2024        Plan:   Normocytic anemia  -     CBC Auto Differential; Future; Expected date: 10/09/2024  -      Basic Metabolic Panel; Future; Expected date: 10/09/2024  -     Ferritin; Future; Expected date: 10/09/2024  -     Iron and TIBC; Future; Expected date: 10/09/2024    Elevated serum immunoglobulin free light chain level  -     CBC Auto Differential; Future; Expected date: 10/09/2024  -     Basic Metabolic Panel; Future; Expected date: 10/09/2024    Anemia due to vitamin B12 deficiency, unspecified B12 deficiency type  -     Ambulatory referral/consult to Endo Procedure ; Future; Expected date: 10/10/2024  -     CBC Auto Differential; Future; Expected date: 10/09/2024  -     Basic Metabolic Panel; Future; Expected date: 10/09/2024  -     Vitamin B12; Future; Expected date: 10/09/2024    Iron deficiency anemia, unspecified iron deficiency anemia type  -     Ambulatory referral/consult to Endo Procedure ; Future; Expected date: 10/10/2024  -     CBC Auto Differential; Future; Expected date: 10/09/2024  -     Basic Metabolic Panel; Future; Expected date: 10/09/2024  -     Ferritin; Future; Expected date: 10/09/2024  -     Iron and TIBC; Future; Expected date: 10/09/2024    Other orders  -     ferumoxytoL (FERAHEME) 510 mg in D5W 117 mL IVPB  -     EPINEPHrine (EPIPEN) 0.3 mg/0.3 mL pen injection 0.3 mg  -     diphenhydrAMINE injection 50 mg  -     hydrocortisone sodium succinate injection 100 mg  -     sodium chloride 0.9% flush 10 mL  -     0.9% NaCl 100 mL flush bag    B12 SL 6889-6018 mcg daily.  Eval with upper and lower endoscopy.  F/u with gyn for pelvic exam and vaginal cuff pap.  Recheck FLC every 6 months.  F/u with pcp regarding microscopic hematuria found with past 2 UA - repeat UA.       Med Onc Chart Routing      Follow up with physician    Follow up with YESICA . F/u 6 weeks after of Ferhaheme infusion - VV   Infusion scheduling note   Feraheme infusion x 1 at the  home infusion center   Injection scheduling note n/a   Labs   Scheduling:  Preferred lab: [facility]  Lab  interval:  cbc, bmp, iron studies, b12   Imaging   N/a   Pharmacy appointment No pharmacy appointment needed      Other referrals       No additional referrals needed  n/a              Collaborating Provider:  Dr. Chase Hayward    Thank You,  Amy Nguyen, JOSIP-C  Benign Hematology

## 2024-10-10 LAB
PATHOLOGIST INTERPRETATION IFE: NORMAL
PATHOLOGIST INTERPRETATION SPE: NORMAL

## 2024-10-14 ENCOUNTER — CLINICAL SUPPORT (OUTPATIENT)
Facility: HOSPITAL | Age: 44
End: 2024-10-14
Payer: COMMERCIAL

## 2024-10-14 DIAGNOSIS — R29.898 WRIST WEAKNESS: ICD-10-CM

## 2024-10-14 DIAGNOSIS — M25.621 DECREASED RANGE OF MOTION OF RIGHT ELBOW: Primary | ICD-10-CM

## 2024-10-14 PROCEDURE — 97140 MANUAL THERAPY 1/> REGIONS: CPT | Mod: PN | Performed by: PHYSICAL THERAPIST

## 2024-10-14 PROCEDURE — 97110 THERAPEUTIC EXERCISES: CPT | Mod: PN | Performed by: PHYSICAL THERAPIST

## 2024-10-14 NOTE — PROGRESS NOTES
[facility]   Physical Therapy Treatment Note     Name: Sharon Hutton  Clinic Number: [id number]    Therapy Diagnosis:   Encounter Diagnoses   Name Primary?    Decreased range of motion of right elbow Yes    Wrist weakness      Physician: Luis Antonio Multani MD    Visit Date: 10/14/2024    Physician Orders: PT Eval and Treat  Medical Diagnosis from Referral: Lateral epicondylitis of right elbow [M77.11]   Evaluation Date: 10/9/2024  Authorization Period Expiration: 9/25/25  Plan of Care Expiration: 12/9/2024  Progress Note Due: 11/9/2024  Visit # / Visits authorized: 1/12 (1/1 eval)  FOTO: 1/3 (last performed on 10/9/2024)    Time In: 1:00 PM  Time Out: 1:50 PM  Total Billable Time: 50 minutes    SUBJECTIVE     Pt reports: she is doing well today. She reports she has consistently been working on her elbow range of motion at home.  She was compliant with home exercise program.  Response to previous treatment: improved elbow Range of motion   Functional change: improved functional reach    Pain: 3/10  Location: right elbow    OBJECTIVE     Objective Measures updated at progress report unless specified.     Treatment     SHARON received the treatments listed below:      Intervention Code  (see below chart) Date/Notes  10/14/24   Manual Therapy MT PROM and stretching to improve R elbow flexion and extension     Mason manipulation   Wrist Extensors Stretch TE 2 minutes   Tennis Elbow Stretch TE 2 minutes   Wrist extension isometrics TE 2 x 1 minute ; 1 #   Pronator Tool TE 1# ; 3x10   Cable Row TE 10# ; 3x10   TB ER Isometric TE 2 x 1 minute; red band                           40 minutes of Therapeutic Exercise (TE) to develop strength, endurance, ROM, and flexibility. (70342)  10 minutes of Manual therapy (MT) to improve pain and ROM. (01513)  0 minutes of Neuromuscular Re-Education (NR)  to improve: Balance, Coordination, Kinesthetic, Sense, Proprioception, and Posture. (41267)  0 minutes of  Therapeutic Activities (TA) to improve functional performance. (03033)  Unattended Electrical Stimulation (ES) for muscle performance and/or pain modulation. (42863)  Vasopneumatic Device Therapy () for management of swelling/edema. (70620)  BFR: Blood flow restriction applied during exercise  NP: Not Performed    Patient Education and Home Exercises     Home Exercises Provided and Patient Education Provided     Education provided:   - HEP, PT POC    Written Home Exercises Provided: yes. Exercises were reviewed and SURAJ was able to demonstrate them prior to the end of the session.  SURAJ demonstrated good  understanding of the education provided. See EMR under Patient Instructions for exercises provided during therapy sessions    ASSESSMENT     Good improvement in elbow range of motion compared to evaluation. She is progressing well and will benefit from continued physical therapy care.    SURAJ Is progressing well towards her goals.   Pt prognosis is Excellent.     Pt will continue to benefit from skilled outpatient physical therapy to address the deficits listed in the problem list box on initial evaluation, provide pt/family education and to maximize pt's level of independence in the home and community environment.     Pt's spiritual, cultural and educational needs considered and pt agreeable to plan of care and goals.     Anticipated barriers to physical therapy: None    Goals:    Short Term Goals:  4 weeks Status  Date Met   PAIN: Pt will report worst pain of 4/10 in order to progress toward max functional ability and improve quality of life. [x] Progressing  [] Met  [] Not Met     FUNCTION: Patient will demonstrate improved function as indicated by a functional score of greater than or equal to 55 out of 100 on FOTO. [x] Progressing  [] Met  [] Not Met     MOBILITY: Patient will improve AROM to 50% of stated goals, listed in objective measures above, in order to progress towards independence with functional  activities.  [x] Progressing  [] Met  [] Not Met     STRENGTH: Patient will improve strength to 50% of stated goals, listed in objective measures above, in order to progress towards independence with functional activities. [x] Progressing  [] Met  [] Not Met     POSTURE: Patient will correct postural deviations in sitting and standing, to decrease pain and promote long term stability.  [x] Progressing  [] Met  [] Not Met        Long Term Goals:  8 weeks Status Date Met   PAIN: Pt will report worst pain of 2/10 in order to progress toward max functional ability and improve quality of life [x] Progressing  [] Met  [] Not Met     FUNCTION: Patient will demonstrate improved function as indicated by a functional score of greater than or equal to 75 out of 100 on FOTO. [x] Progressing  [] Met  [] Not Met     MOBILITY: Patient will improve AROM to stated goals, listed in objective measures above, in order to return to maximal functional potential and improve quality of life. Goal: full elbow ROM [x] Progressing  [] Met  [] Not Met     STRENGTH: Patient will improve strength to stated goals, listed in objective measures above, in order to improve functional independence and quality of life. Goal: 5/5 for all R elbow strength measures [x] Progressing  [] Met  [] Not Met         PLAN   Continue per plan of care.  Progress as tolerated.    Elfego Dockery, PT

## 2024-10-15 ENCOUNTER — HOSPITAL ENCOUNTER (OUTPATIENT)
Dept: PREADMISSION TESTING | Facility: HOSPITAL | Age: 44
Discharge: HOME | End: 2024-10-15
Attending: INTERNAL MEDICINE
Payer: COMMERCIAL

## 2024-10-15 DIAGNOSIS — D51.9 ANEMIA DUE TO VITAMIN B12 DEFICIENCY, UNSPECIFIED B12 DEFICIENCY TYPE: Primary | ICD-10-CM

## 2024-10-15 DIAGNOSIS — D50.9 IRON DEFICIENCY ANEMIA, UNSPECIFIED IRON DEFICIENCY ANEMIA TYPE: ICD-10-CM

## 2024-10-16 RX ORDER — SODIUM, POTASSIUM,MAG SULFATES 17.5-3.13G
1 SOLUTION, RECONSTITUTED, ORAL ORAL DAILY
Qty: 1 KIT | Refills: 0 | Status: SHIPPED | OUTPATIENT
Start: 2024-10-16 | End: 2024-10-18

## 2024-10-23 ENCOUNTER — TELEPHONE (OUTPATIENT)
Dept: PHARMACY | Facility: CLINIC | Age: 44
End: 2024-10-23
Payer: COMMERCIAL

## 2024-10-27 DIAGNOSIS — R73.02 IGT (IMPAIRED GLUCOSE TOLERANCE): ICD-10-CM

## 2024-10-27 DIAGNOSIS — E88.810 METABOLIC SYNDROME X: ICD-10-CM

## 2024-10-28 RX ORDER — METFORMIN HYDROCHLORIDE 500 MG/1
1500 TABLET, EXTENDED RELEASE ORAL
Qty: 270 TABLET | Refills: 1 | Status: SHIPPED | OUTPATIENT
Start: 2024-10-28 | End: 2025-04-26

## 2024-11-05 ENCOUNTER — NURSE TRIAGE (OUTPATIENT)
Dept: ADMINISTRATIVE | Facility: CLINIC | Age: 44
End: 2024-11-05
Payer: COMMERCIAL

## 2024-11-06 ENCOUNTER — TELEPHONE (OUTPATIENT)
Dept: PREADMISSION TESTING | Facility: HOSPITAL | Age: 44
End: 2024-11-06
Payer: COMMERCIAL

## 2024-11-06 DIAGNOSIS — D50.0 IRON DEFICIENCY ANEMIA DUE TO CHRONIC BLOOD LOSS: Primary | ICD-10-CM

## 2024-11-06 DIAGNOSIS — D50.9 IRON DEFICIENCY ANEMIA, UNSPECIFIED IRON DEFICIENCY ANEMIA TYPE: Primary | ICD-10-CM

## 2024-11-06 RX ORDER — POLYETHYLENE GLYCOL 3350, SODIUM SULFATE ANHYDROUS, SODIUM BICARBONATE, SODIUM CHLORIDE, POTASSIUM CHLORIDE 236; 22.74; 6.74; 5.86; 2.97 G/4L; G/4L; G/4L; G/4L; G/4L
4 POWDER, FOR SOLUTION ORAL ONCE
Qty: 4000 ML | Refills: 0 | Status: SHIPPED | OUTPATIENT
Start: 2024-11-06 | End: 2024-11-06

## 2024-11-06 NOTE — TELEPHONE ENCOUNTER
Pt called stating she is scheduled for a colonoscopy tomorrow morning. Pt reports she just finished first half of prep and drank water after like instructed and became very nauseous. Pt asked if she is able to drink something besides water. Pt informed of what else she can have per pre-op instructions. Pt verbalized understanding.   Reason for Disposition   Question about upcoming scheduled surgery, procedure or test, no triage required, and triager able to answer question    Protocols used: Information Only Call - No Triage-A-

## 2024-11-20 ENCOUNTER — PATIENT MESSAGE (OUTPATIENT)
Dept: PREADMISSION TESTING | Facility: HOSPITAL | Age: 44
End: 2024-11-20
Payer: COMMERCIAL

## 2024-11-20 NOTE — TELEPHONE ENCOUNTER
----- Message from Jasmin Hedrick sent at 11/20/2024  3:22 PM CST -----  Regarding: prep  Please call patient she has prep problem    Thanks

## 2024-11-21 RX ORDER — SOD SULF/POT CHLORIDE/MAG SULF 1.479 G
12 TABLET ORAL DAILY
Qty: 24 TABLET | Refills: 0 | Status: SHIPPED | OUTPATIENT
Start: 2024-11-21

## 2024-11-25 ENCOUNTER — TELEPHONE (OUTPATIENT)
Dept: PHARMACY | Facility: CLINIC | Age: 44
End: 2024-11-25
Payer: COMMERCIAL

## 2024-11-25 NOTE — TELEPHONE ENCOUNTER
[facility] Chart Review & Patient Outreach Details For Medication Adherence Project    Reason for Outreach Encounter: 3rd Party payor non-compliance report ([employer], [employer], C, etc)  2.  Patient Outreach Method: Reviewed Patient Chart  3.   Medication in question: metformin   LAST FILLED: 10/28/24 for 90 day supply  Diabetes Medications               metFORMIN (GLUCOPHAGE-XR) 500 MG ER 24hr tablet Take 3 tablets (1,500 mg total) by mouth daily with breakfast.              4.  Reviewed and or Updates Made To: Patient Chart  5. Outreach Outcomes and/or actions taken: Patient filled medication and is on track to be adherent

## 2024-11-26 ENCOUNTER — ANESTHESIA EVENT (OUTPATIENT)
Dept: ENDOSCOPY | Facility: HOSPITAL | Age: 44
End: 2024-11-26
Payer: COMMERCIAL

## 2024-11-26 NOTE — ANESTHESIA PREPROCEDURE EVALUATION
11/26/2024  Sharon Hutton is a 44 y.o., female.    Past Medical History:   Diagnosis Date    Adult general medical examination 04/10/2017    Anemia     Biotin deficiency disease     Hyperlipidemia     Metabolic syndrome X     Vitamin D deficiency      Past Surgical History:   Procedure Laterality Date    BREAST BIOPSY Left 09/2020    HERNIA REPAIR      ROBOTIC HYSTERECTOMY, WITH SALPINGECTOMY  2016     Patient Active Problem List   Diagnosis    Biotin deficiency disease    Metabolic syndrome X    Vitamin D deficiency    Anxiety    Lumbar spine pain    Chronic idiopathic constipation    Asymptomatic microscopic hematuria    Acute torn meniscus of knee, left, subsequent encounter    History of robot-assisted laparoscopic hysterectomy    S/P robot-assisted surgical procedure    Uterine fibroid    Iron deficiency anemia    Decreased range of motion of right elbow    Wrist weakness       Pre-op Assessment    I have reviewed the Patient Summary Reports.     I have reviewed the Nursing Notes. I have reviewed the NPO Status.   I have reviewed the Medications.     Review of Systems  Anesthesia Hx:  No problems with previous Anesthesia   History of prior surgery of interest to airway management or planning:          Denies Family Hx of Anesthesia complications.    Denies Personal Hx of Anesthesia complications.                    Social:  Non-Smoker, Social Alcohol Use       Hematology/Oncology:    Oncology Normal    -- Anemia:                                  EENT/Dental:  EENT/Dental Normal           Cardiovascular:                hyperlipidemia                               Pulmonary:  Pulmonary Normal                       Renal/:  Renal/ Normal                 Hepatic/GI:  Bowel Prep.                   Musculoskeletal:  Musculoskeletal Normal                Neurological:  Neurology Normal                                       Endocrine:        Obesity / BMI > 30  Dermatological:  Skin Normal    Psych:   anxiety                 Physical Exam  General: Well nourished    Airway:  Mallampati: I   Mouth Opening: Normal  TM Distance: Normal  Tongue: Normal  Neck ROM: Normal ROM    Dental:  Intact        Anesthesia Plan  Type of Anesthesia, risks & benefits discussed:    Anesthesia Type: Gen Natural Airway  Intra-op Monitoring Plan: Standard ASA Monitors  Post Op Pain Control Plan: multimodal analgesia and IV/PO Opioids PRN  Induction:  IV  Informed Consent: Informed consent signed with the Patient and all parties understand the risks and agree with anesthesia plan.  All questions answered.   ASA Score: 2  Day of Surgery Review of History & Physical: H&P Update referred to the surgeon/provider.    Ready For Surgery From Anesthesia Perspective.     .

## 2024-12-02 ENCOUNTER — HOSPITAL ENCOUNTER (OUTPATIENT)
Facility: HOSPITAL | Age: 44
Discharge: HOME | End: 2024-12-02
Attending: INTERNAL MEDICINE | Admitting: INTERNAL MEDICINE
Payer: COMMERCIAL

## 2024-12-02 ENCOUNTER — ANESTHESIA (OUTPATIENT)
Dept: ENDOSCOPY | Facility: HOSPITAL | Age: 44
End: 2024-12-02
Payer: COMMERCIAL

## 2024-12-02 DIAGNOSIS — D50.9 IRON DEFICIENCY ANEMIA: ICD-10-CM

## 2024-12-02 DIAGNOSIS — D50.9 IRON DEFICIENCY ANEMIA, UNSPECIFIED IRON DEFICIENCY ANEMIA TYPE: Primary | ICD-10-CM

## 2024-12-02 LAB — POCT GLUCOSE: 89 MG/DL (ref 70–110)

## 2024-12-02 PROCEDURE — 45380 COLONOSCOPY AND BIOPSY: CPT | Mod: ,,, | Performed by: INTERNAL MEDICINE

## 2024-12-02 PROCEDURE — 45380 COLONOSCOPY AND BIOPSY: CPT | Performed by: INTERNAL MEDICINE

## 2024-12-02 PROCEDURE — 37000008 HC ANESTHESIA 1ST 15 MINUTES: Performed by: INTERNAL MEDICINE

## 2024-12-02 PROCEDURE — 43239 EGD BIOPSY SINGLE/MULTIPLE: CPT | Performed by: INTERNAL MEDICINE

## 2024-12-02 PROCEDURE — 27201012 HC FORCEPS, HOT/COLD, DISP: Performed by: INTERNAL MEDICINE

## 2024-12-02 PROCEDURE — 43239 EGD BIOPSY SINGLE/MULTIPLE: CPT | Mod: 51,,, | Performed by: INTERNAL MEDICINE

## 2024-12-02 PROCEDURE — D9220A PRA ANESTHESIA: Mod: ,,, | Performed by: NURSE ANESTHETIST, CERTIFIED REGISTERED

## 2024-12-02 PROCEDURE — 25000003 PHARM REV CODE 250: Performed by: INTERNAL MEDICINE

## 2024-12-02 PROCEDURE — 63600175 PHARM REV CODE 636 W HCPCS: Performed by: NURSE ANESTHETIST, CERTIFIED REGISTERED

## 2024-12-02 PROCEDURE — 63600175 PHARM REV CODE 636 W HCPCS: Performed by: INTERNAL MEDICINE

## 2024-12-02 PROCEDURE — 37000009 HC ANESTHESIA EA ADD 15 MINS: Performed by: INTERNAL MEDICINE

## 2024-12-02 RX ORDER — FENTANYL CITRATE 50 UG/ML
INJECTION, SOLUTION INTRAMUSCULAR; INTRAVENOUS
Status: DISCONTINUED | OUTPATIENT
Start: 2024-12-02 | End: 2024-12-02

## 2024-12-02 RX ORDER — PROPOFOL 10 MG/ML
VIAL (ML) INTRAVENOUS
Status: DISCONTINUED | OUTPATIENT
Start: 2024-12-02 | End: 2024-12-02

## 2024-12-02 RX ORDER — LIDOCAINE HYDROCHLORIDE 20 MG/ML
INJECTION INTRAVENOUS
Status: DISCONTINUED | OUTPATIENT
Start: 2024-12-02 | End: 2024-12-02

## 2024-12-02 RX ORDER — SODIUM CHLORIDE, SODIUM LACTATE, POTASSIUM CHLORIDE, CALCIUM CHLORIDE 600; 310; 30; 20 MG/100ML; MG/100ML; MG/100ML; MG/100ML
INJECTION, SOLUTION INTRAVENOUS CONTINUOUS
Status: DISCONTINUED | OUTPATIENT
Start: 2024-12-02 | End: 2024-12-02 | Stop reason: HOSPADM

## 2024-12-02 RX ORDER — DEXTROMETHORPHAN/PSEUDOEPHED 2.5-7.5/.8
DROPS ORAL
Status: DISCONTINUED | OUTPATIENT
Start: 2024-12-02 | End: 2024-12-02 | Stop reason: HOSPADM

## 2024-12-02 RX ADMIN — PROPOFOL 50 MG: 10 INJECTION, EMULSION INTRAVENOUS at 08:12

## 2024-12-02 RX ADMIN — PROPOFOL 20 MG: 10 INJECTION, EMULSION INTRAVENOUS at 08:12

## 2024-12-02 RX ADMIN — PROPOFOL 120 MG: 10 INJECTION, EMULSION INTRAVENOUS at 08:12

## 2024-12-02 RX ADMIN — SODIUM CHLORIDE, POTASSIUM CHLORIDE, SODIUM LACTATE AND CALCIUM CHLORIDE: 600; 310; 30; 20 INJECTION, SOLUTION INTRAVENOUS at 08:12

## 2024-12-02 RX ADMIN — LIDOCAINE HYDROCHLORIDE 70 MG: 20 INJECTION INTRAVENOUS at 08:12

## 2024-12-02 RX ADMIN — FENTANYL CITRATE 50 MCG: 50 INJECTION, SOLUTION INTRAMUSCULAR; INTRAVENOUS at 08:12

## 2024-12-02 NOTE — TRANSFER OF CARE
"Anesthesia Transfer of Care Note    Patient: Sharon Hutton    Procedure(s) Performed: Procedure(s) (LRB):  EGD (ESOPHAGOGASTRODUODENOSCOPY) (N/A)  COLONOSCOPY (N/A)    Patient location: PACU    Anesthesia Type: general    Transport from OR: Transported from OR on room air with adequate spontaneous ventilation    Post pain: adequate analgesia    Post assessment: no apparent anesthetic complications    Post vital signs: stable    Level of consciousness: awake    Nausea/Vomiting: no nausea/vomiting    Complications: none    Transfer of care protocol was followed      Last vitals: Visit Vitals  /85 (BP Location: Right arm, Patient Position: Sitting)   Pulse 73   Temp 36.4 °C (97.6 °F) (Temporal)   Resp 16   Ht 5' 5" (1.651 m)   Wt 84.9 kg (187 lb 4.5 oz)   SpO2 100%   Breastfeeding No   BMI 31.17 kg/m²     "

## 2024-12-02 NOTE — [employer]ATION PATIENT INSTRUCTIONS
Discharge Summary/Instructions after an Endoscopic Procedure  Patient Name: Sharon Hutton  Patient MRN: [id number]  Patient YOB: 1980 Monday, December 2, 2024  Crystal Quick MD  Dear patient,  As a result of recent federal legislation (The Federal Cures Act), you may   receive lab or pathology results from your procedure in your [facility]   account before your physician is able to contact you. Your physician or   their representative will relay the results to you with their   recommendations at their soonest availability.  Thank you,  RESTRICTIONS:  During your procedure today, you received medications for sedation.  These   medications may affect your judgment, balance and coordination.  Therefore,   for 24 hours, you have the following restrictions:   - DO NOT drive a car, operate machinery, make legal/financial decisions,   sign important papers or drink alcohol.    ACTIVITY:  Today: no heavy lifting, straining or running due to procedural   sedation/anesthesia.  The following day: return to full activity including work.  DIET:  Eat and drink normally unless instructed otherwise.     TREATMENT FOR COMMON SIDE EFFECTS:  - Mild abdominal pain, nausea, belching, bloating or excessive gas:  rest,   eat lightly and use a heating pad.  - Sore Throat: treat with throat lozenges and/or gargle with warm salt   water.  - Because air was used during the procedure, expelling large amounts of air   from your rectum or belching is normal.  - If a bowel prep was taken, you may not have a bowel movement for 1-3 days.    This is normal.  SYMPTOMS TO WATCH FOR AND REPORT TO YOUR PHYSICIAN:  1. Abdominal pain or bloating, other than gas cramps.  2. Chest pain.  3. Back pain.  4. Signs of infection such as: chills or fever occurring within 24 hours   after the procedure.  5. Rectal bleeding, which would show as bright red, maroon, or black stools.   (A tablespoon of blood from the rectum is not serious, especially  if   hemorrhoids are present.)  6. Vomiting.  7. Weakness or dizziness.  GO DIRECTLY TO THE NEAREST EMERGENCY ROOM IF YOU HAVE ANY OF THE FOLLOWING:      Difficulty breathing              Chills and/or fever over 101 F   Persistent vomiting and/or vomiting blood   Severe abdominal pain   Severe chest pain   Black, tarry stools   Bleeding- more than one tablespoon   Any other symptom or condition that you feel may need urgent attention  Your doctor recommends these additional instructions:  If any biopsies were taken, your doctors clinic will contact you in 1 to 2   weeks with any results.  - Discharge patient to home (via wheelchair).   - Resume previous diet.   - Continue present medications.   - Await pathology results.   - Patient has a contact number available for emergencies.  The signs and   symptoms of potential delayed complications were discussed with the   patient.  Return to normal activities tomorrow.  Written discharge   instructions were provided to the patient.   - Repeat colonoscopy for surveillance based on pathology results.  For questions, problems or results please call your physician Crystal Quick MD at Work:  [phone]  If you have any questions about the above instructions, call the GI   department at [phone] or call the endoscopy unit at [phone]   from 7am until 3 pm.  [facility], EMERGENCY ROOM PHONE NUMBER:   [phone]  IF A COMPLICATION OR EMERGENCY SITUATION ARISES AND YOU ARE UNABLE TO REACH   YOUR PHYSICIAN - GO DIRECTLY TO THE EMERGENCY ROOM.  I have read or have had read to me these discharge instructions for my   procedure and have received a written copy.  I understand these   instructions and will follow-up with my physician if I have any questions.     __________________________________       _____________________________________  Nurse Signature                                          Patient/Designated   Responsible Party  Signature  MD Crystal Mehta MD  12/2/2024 8:48:02 AM  [employer]

## 2024-12-02 NOTE — PLAN OF CARE
Discharge instructions reviewed with pt and spouse, handouts given, verbalized understanding with no further questions at this time. Dr. Quick spoke to pt at bedside, reviewed procedure and answered questions aware they are awaiting biopsy results with MD telephone number provided per AVS sheet. VSS on RA, no pain or nausea noted, tolerating po fluids without difficulty, no other complaints noted. Fall precautions reviewed, consents in chart.

## 2024-12-02 NOTE — ANESTHESIA POSTPROCEDURE EVALUATION
Anesthesia Post Evaluation    Patient: Sharon Vessell    Procedure(s) Performed: Procedure(s) (LRB):  EGD (ESOPHAGOGASTRODUODENOSCOPY) (N/A)  COLONOSCOPY (N/A)    Final Anesthesia Type: general      Patient location during evaluation: PACU  Patient participation: Yes- Able to Participate  Level of consciousness: awake  Post-procedure vital signs: reviewed and stable  Pain management: adequate  Airway patency: patent    PONV status at discharge: No PONV  Anesthetic complications: no      Cardiovascular status: stable  Respiratory status: unassisted  Hydration status: euvolemic  Follow-up not needed.              Vitals Value Taken Time   /73 12/02/24 0856        Pulse 76 12/02/24 0901   Resp 33 12/02/24 0901   SpO2 100 % 12/02/24 0901   Vitals shown include unfiled device data.      No case tracking events are documented in the log.      Pain/Andressa Score: Andressa Score: 10 (12/2/2024  8:47 AM)

## 2024-12-02 NOTE — DISCHARGE SUMMARY
[facility] - Endoscopy 1st Fl  Discharge Note  Short Stay    Procedure(s) (LRB):  EGD (ESOPHAGOGASTRODUODENOSCOPY) (N/A)  COLONOSCOPY (N/A)      OUTCOME: Patient tolerated treatment/procedure well without complication and is now ready for discharge.    DISPOSITION: Home or Self Care    FINAL DIAGNOSIS:  Iron deficiency anemia    FOLLOWUP: With primary care provider    DISCHARGE INSTRUCTIONS:  No discharge procedures on file.

## 2024-12-02 NOTE — H&P
Short Stay Endoscopy History and Physical    PCP - Aaliyah Medrano MD    Procedure - EGD and colonoscopy  ASA - per anesthesia  Mallampati - per anesthesia  History of Anesthesia problems - no  Family history Anesthesia problems -  no     HPI:  This is a 44 y.o. female here for evaluation of :   Active Hospital Problems    Diagnosis  POA    *Iron deficiency anemia [D50.9]  Yes      Resolved Hospital Problems   No resolved problems to display.         Health Maintenance         Date Due Completion Date    Influenza Vaccine (1) 09/01/2024 3/5/2024 (Declined)    Override on 3/5/2024: Declined    Override on 1/7/2020: Declined    COVID-19 Vaccine (3 - 2024-25 season) 09/01/2024 6/2/2021    Hemoglobin A1c (Prediabetes) 09/18/2025 9/18/2024    Mammogram 05/21/2026 5/21/2024    Override on 9/1/2020: [facility]    TETANUS VACCINE 04/10/2027 4/10/2017    RSV Vaccine (Age 60+ and Pregnant patients) (1 - 1-dose 75+ series) 02/21/2056 ---              ROS:  CONSTITUTIONAL: Denies weight change,  fatigue, fevers, chills, night sweats.  CARDIOVASCULAR: Denies chest pain, shortness of breath, orthopnea and edema.  RESPIRATORY: Denies cough, hemoptysis, dyspnea, and wheezing.  GI: See HPI.    Medical History:   Past Medical History:   Diagnosis Date    Adult general medical examination 04/10/2017    Anemia     Biotin deficiency disease     Hyperlipidemia     Metabolic syndrome X     Vitamin D deficiency        Surgical History:   Past Surgical History:   Procedure Laterality Date    BREAST BIOPSY Left 09/2020    HERNIA REPAIR      ROBOTIC HYSTERECTOMY, WITH SALPINGECTOMY  2016       Family History:   Family History   Problem Relation Name Age of Onset    Diabetes Mother      Hypertension Mother      Asthma Sister         Social History:   Social History     Tobacco Use    Smoking status: Never    Smokeless tobacco: Never   Substance Use Topics    Alcohol use: Yes       Allergies:   Review of patient's  allergies indicates:   Allergen Reactions    Trulicity [dulaglutide]      Abdominal pain       Medications:   No current facility-administered medications on file prior to encounter.     Current Outpatient Medications on File Prior to Encounter   Medication Sig Dispense Refill    metFORMIN (GLUCOPHAGE-XR) 500 MG ER 24hr tablet Take 3 tablets (1,500 mg total) by mouth daily with breakfast. 270 tablet 1    ALPRAZolam (XANAX) 0.25 MG tablet Take 1 tablet (0.25 mg total) by mouth 2 (two) times daily as needed for Anxiety. 30 tablet 0    cholecalciferol, vitamin D3, (VITAMIN D3) 250 mcg (10,000 unit) Cap capsule Take 1 capsule (10,000 Units total) by mouth once a week. 12 capsule 1    lactobacillus combination no.9 (ADULT 50 PLUS PROBIOTIC) 4 billion cell Cap Take by mouth.      traMADoL (ULTRAM) 50 mg tablet Take 1 tablet (50 mg total) by mouth every 6 (six) hours. 14 tablet 0       Physical Exam:  Vital Signs:   Vitals:    12/02/24 0715   BP: 125/85   Pulse: 73   Resp: 16   Temp: 97.6 °F (36.4 °C)     General Appearance: Well appearing in no acute distress  ENT: OP clear  Chest: CTA B  CV: RRR, no m/r/g  Abd: s/nt/nd/nabs  Ext: no edema    Labs:Reviewed    IMP:  Active Hospital Problems    Diagnosis  POA    *Iron deficiency anemia [D50.9]  Yes      Resolved Hospital Problems   No resolved problems to display.         Plan:   I have explained the risks and benefits of upper endoscopy and colonoscopy to the patient including but not limited to bleeding, perforation, infection, and death. The patient wishes to proceed.

## 2024-12-02 NOTE — PROVATION PATIENT INSTRUCTIONS
Discharge Summary/Instructions after an Endoscopic Procedure  Patient Name: Sharon Hutton  Patient MRN: [id number]  Patient YOB: 1980 Monday, December 2, 2024  Crystal Quick MD  Dear patient,  As a result of recent federal legislation (The Federal Cures Act), you may   receive lab or pathology results from your procedure in your [facility]   account before your physician is able to contact you. Your physician or   their representative will relay the results to you with their   recommendations at their soonest availability.  Thank you,  RESTRICTIONS:  During your procedure today, you received medications for sedation.  These   medications may affect your judgment, balance and coordination.  Therefore,   for 24 hours, you have the following restrictions:   - DO NOT drive a car, operate machinery, make legal/financial decisions,   sign important papers or drink alcohol.    ACTIVITY:  Today: no heavy lifting, straining or running due to procedural   sedation/anesthesia.  The following day: return to full activity including work.  DIET:  Eat and drink normally unless instructed otherwise.     TREATMENT FOR COMMON SIDE EFFECTS:  - Mild abdominal pain, nausea, belching, bloating or excessive gas:  rest,   eat lightly and use a heating pad.  - Sore Throat: treat with throat lozenges and/or gargle with warm salt   water.  - Because air was used during the procedure, expelling large amounts of air   from your rectum or belching is normal.  - If a bowel prep was taken, you may not have a bowel movement for 1-3 days.    This is normal.  SYMPTOMS TO WATCH FOR AND REPORT TO YOUR PHYSICIAN:  1. Abdominal pain or bloating, other than gas cramps.  2. Chest pain.  3. Back pain.  4. Signs of infection such as: chills or fever occurring within 24 hours   after the procedure.  5. Rectal bleeding, which would show as bright red, maroon, or black stools.   (A tablespoon of blood from the rectum is not serious, especially  if   hemorrhoids are present.)  6. Vomiting.  7. Weakness or dizziness.  GO DIRECTLY TO THE NEAREST EMERGENCY ROOM IF YOU HAVE ANY OF THE FOLLOWING:      Difficulty breathing              Chills and/or fever over 101 F   Persistent vomiting and/or vomiting blood   Severe abdominal pain   Severe chest pain   Black, tarry stools   Bleeding- more than one tablespoon   Any other symptom or condition that you feel may need urgent attention  Your doctor recommends these additional instructions:  If any biopsies were taken, your doctors clinic will contact you in 1 to 2   weeks with any results.  - Discharge patient to home (via wheelchair).   - Resume previous diet.   - Continue present medications.   - Await pathology results.   - Patient has a contact number available for emergencies.  The signs and   symptoms of potential delayed complications were discussed with the   patient.  Return to normal activities tomorrow.  Written discharge   instructions were provided to the patient.   - Resume anticoagulant at prior dose.  For questions, problems or results please call your physician Crystal Quick MD at Work:  [phone]  If you have any questions about the above instructions, call the GI   department at [phone] or call the endoscopy unit at [phone]   from 7am until 3 pm.  [facility], EMERGENCY ROOM PHONE NUMBER:   [phone]  IF A COMPLICATION OR EMERGENCY SITUATION ARISES AND YOU ARE UNABLE TO REACH   YOUR PHYSICIAN - GO DIRECTLY TO THE EMERGENCY ROOM.  I have read or have had read to me these discharge instructions for my   procedure and have received a written copy.  I understand these   instructions and will follow-up with my physician if I have any questions.     __________________________________       _____________________________________  Nurse Signature                                          Patient/Designated   Responsible Party Signature  Crystal Quick  MD Crystal Quick MD  12/2/2024 8:49:38 AM  [employer]

## 2024-12-03 VITALS
OXYGEN SATURATION: 100 % | TEMPERATURE: 98 F | HEART RATE: 73 BPM | HEIGHT: 65 IN | DIASTOLIC BLOOD PRESSURE: 85 MMHG | SYSTOLIC BLOOD PRESSURE: 125 MMHG | BODY MASS INDEX: 31.2 KG/M2 | RESPIRATION RATE: 18 BRPM | WEIGHT: 187.25 LBS

## 2024-12-04 ENCOUNTER — PATIENT MESSAGE (OUTPATIENT)
Dept: GASTROENTEROLOGY | Facility: CLINIC | Age: 44
End: 2024-12-04
Payer: COMMERCIAL

## 2024-12-28 ENCOUNTER — TELEPHONE (OUTPATIENT)
Dept: PHARMACY | Facility: CLINIC | Age: 44
End: 2024-12-28
Payer: COMMERCIAL

## 2024-12-28 NOTE — TELEPHONE ENCOUNTER
[facility]/Population Health Chart Review & Patient Outreach Details For Medication Adherence Project    Reason for Outreach Encounter: 3rd Party payor non-compliance report ([employer], [employer], C, etc)  2.  Patient Outreach Method: Reviewed patient chart   3.   Medication in question:    Diabetes Medications               metFORMIN (GLUCOPHAGE-XR) 500 MG ER 24hr tablet Take 3 tablets (1,500 mg total) by mouth daily with breakfast.                 Metformin  last filled  10/28/24 for 90 day supply    4.  Reviewed and or Updates Made To: Patient Chart  5. Outreach Outcomes and/or actions taken: Patient filled medication and is on track to be adherent  Additional Notes:

## 2025-02-22 ENCOUNTER — TELEPHONE (OUTPATIENT)
Dept: PHARMACY | Facility: CLINIC | Age: 45
End: 2025-02-22
Payer: COMMERCIAL

## 2025-02-22 NOTE — TELEPHONE ENCOUNTER
[facility]/Population Health Chart Review & Patient Outreach Details For Medication Adherence Project    Reason for Outreach Encounter: 3rd Party payor non-compliance report ([employer], [employer], C, etc)  2.  Patient Outreach Method: Reviewed patient chart  and [employer] message  3.   Medication in question:    Diabetes Medications              metFORMIN (GLUCOPHAGE-XR) 500 MG ER 24hr tablet Take 3 tablets (1,500 mg total) by mouth daily with breakfast.                 LF 90 ds 10/28/24    4.  Reviewed and or Updates Made To: Patient Chart  5. Outreach Outcomes and/or actions taken: Sent inquiry to patient: Waiting for response  Additional Notes:   Not a DM pt

## 2025-03-10 PROBLEM — A09 INFECTIOUS COLITIS: Status: ACTIVE | Noted: 2023-03-16

## 2025-04-22 ENCOUNTER — LAB VISIT (OUTPATIENT)
Dept: LAB | Facility: HOSPITAL | Age: 45
End: 2025-04-22
Attending: INTERNAL MEDICINE
Payer: COMMERCIAL

## 2025-04-22 ENCOUNTER — TELEPHONE (OUTPATIENT)
Dept: PHARMACY | Facility: CLINIC | Age: 45
End: 2025-04-22
Payer: COMMERCIAL

## 2025-04-22 ENCOUNTER — OFFICE VISIT (OUTPATIENT)
Dept: GASTROENTEROLOGY | Facility: CLINIC | Age: 45
End: 2025-04-22
Payer: COMMERCIAL

## 2025-04-22 VITALS — WEIGHT: 184.94 LBS | BODY MASS INDEX: 30.81 KG/M2 | HEIGHT: 65 IN

## 2025-04-22 DIAGNOSIS — K52.9 COLITIS: ICD-10-CM

## 2025-04-22 DIAGNOSIS — K52.9 COLITIS: Primary | ICD-10-CM

## 2025-04-22 PROCEDURE — 1159F MED LIST DOCD IN RCRD: CPT | Mod: CPTII,95,, | Performed by: INTERNAL MEDICINE

## 2025-04-22 PROCEDURE — 3044F HG A1C LEVEL LT 7.0%: CPT | Mod: CPTII,95,, | Performed by: INTERNAL MEDICINE

## 2025-04-22 PROCEDURE — 98006 SYNCH AUDIO-VIDEO EST MOD 30: CPT | Mod: 95,,, | Performed by: INTERNAL MEDICINE

## 2025-04-22 PROCEDURE — 3008F BODY MASS INDEX DOCD: CPT | Mod: CPTII,95,, | Performed by: INTERNAL MEDICINE

## 2025-04-22 PROCEDURE — 36415 COLL VENOUS BLD VENIPUNCTURE: CPT

## 2025-04-22 PROCEDURE — 85652 RBC SED RATE AUTOMATED: CPT

## 2025-04-22 PROCEDURE — 86140 C-REACTIVE PROTEIN: CPT

## 2025-04-22 NOTE — PROGRESS NOTES
[facility]  Gastroenterology      PCP: Aaliyah Medrano MD    4/22/25    HPI       colon inflammation      Additional comments: Further evaluation          Last edited by Sury Ritchie MA on 4/22/2025  2:00 PM.        The patient location is: Home  The chief complaint leading to consultation is: F/u Colonoscopy    Visit type: audiovisual    Face to Face time with patient: 20 minutes of total time spent on the encounter, which includes face to face time and non-face to face time preparing to see the patient (eg, review of tests), Obtaining and/or reviewing separately obtained history, Documenting clinical information in the electronic or other health record, Independently interpreting results (not separately reported) and communicating results to the patient/family/caregiver, or Care coordination (not separately reported).         Each patient to whom he or she provides medical services by telemedicine is:  (1) informed of the relationship between the physician and patient and the respective role of any other health care provider with respect to management of the patient; and (2) notified that he or she may decline to receive medical services by telemedicine and may withdraw from such care at any time.    Notes:       Reason for Visit: F/u Colonoscopy    Subjective:   Sharon Hutton is a 45 y.o. female here for follow-up colonoscopy. Patient had a colonoscopy performed in 12/2024 for GABRIELA. Colonoscopy showed mild scattered inflammation in sigmoid through cecum. Biopsies showed chronic active colitis- differential of infection vs medication induced vs IBD. Patient without any diarrhea and instead has constipation issues. She is not taking any medications aside from GLP-1 which she only recently started. States she has FH of autoimmune diseases. On most recent labs, anemia has resolved.     Past Medical History:   Diagnosis Date    Adult general medical examination 04/10/2017    Anemia     Biotin  deficiency disease     Hyperlipidemia     Metabolic syndrome X     Vitamin D deficiency        Past Surgical History:   Procedure Laterality Date    BREAST BIOPSY Left 09/2020    COLONOSCOPY N/A 12/2/2024    Procedure: COLONOSCOPY;  Surgeon: Crystal Quick MD;  Location: [facility];  Service: Endoscopy;  Laterality: N/A;    ESOPHAGOGASTRODUODENOSCOPY N/A 12/2/2024    Procedure: EGD (ESOPHAGOGASTRODUODENOSCOPY);  Surgeon: Crystal Quick MD;  Location: [facility];  Service: Endoscopy;  Laterality: N/A;    HERNIA REPAIR      ROBOTIC HYSTERECTOMY, WITH SALPINGECTOMY  2016       Medications Ordered Prior to Encounter[1]    Review of patient's allergies indicates:   Allergen Reactions    Trulicity [dulaglutide]      Abdominal pain       Social History[2]    Family History   Problem Relation Name Age of Onset    Diabetes Mother      Hypertension Mother      Asthma Sister         Review of Systems   Constitutional:  Negative for appetite change, fever and unexpected weight change.   HENT:  Negative for postnasal drip, rhinorrhea, sneezing, sore throat and trouble swallowing.    Eyes:  Negative for visual disturbance.   Respiratory:  Negative for cough, shortness of breath and wheezing.    Cardiovascular:  Negative for chest pain, palpitations and leg swelling.   Gastrointestinal:  Positive for constipation. Negative for abdominal pain, blood in stool, diarrhea, nausea and vomiting.   Genitourinary:  Negative for dysuria.   Musculoskeletal:  Negative for arthralgias, joint swelling and myalgias.   Skin:  Negative for color change, pallor and rash.   Neurological:  Negative for weakness, light-headedness, numbness and headaches.   Hematological:  Negative for adenopathy. Does not bruise/bleed easily.   Psychiatric/Behavioral:  Negative for agitation.            Objective:   Vitals: There were no vitals filed for this visit.    Physical Exam Unable to perform due to video visit    IMPRESSION     Problem List Items  Addressed This Visit    None  Visit Diagnoses         Colitis    -  Primary    Relevant Orders    ESR (SEDIMENTATION RATE, MANUAL)    C-reactive protein    Calprotectin, Stool    IBD Expanded Panel            PLANS:    - Inflammation noted on recent colonoscopy- sigmoid through cecum concerning for possible underlying Crohn's. Biopsies show chronic active colitis but findings nonspecific with differential as mentioned in HPI  - Will obtain ESR, CRP, fecal calprotectin and IBD panel to help support diagnosis  - Pending the above labs, if IBD felt to be confirmed, will discuss medication options with patient. Briefly discussed entity of IBD and possible use of biologic medications  - HIV, Hep B/C testing negative earlier this year    Colitis  -     ESR (SEDIMENTATION RATE, MANUAL); Future; Expected date: 04/22/2025  -     C-reactive protein; Future; Expected date: 04/22/2025  -     Calprotectin, Stool; Future; Expected date: 04/22/2025  -     IBD Expanded Panel; Future; Expected date: 04/22/2025        Crystal Quick MD  Gastroenterology              [1]   Current Outpatient Medications on File Prior to Visit   Medication Sig Dispense Refill    ALPRAZolam (XANAX) 0.25 MG tablet Take 1 tablet (0.25 mg total) by mouth 2 (two) times daily as needed for Anxiety. 30 tablet 0    lactobacillus combination no.9 (ADULT 50 PLUS PROBIOTIC) 4 billion cell Cap Take by mouth.      nystatin (MYCOSTATIN) cream Apply topically 2 (two) times daily. (Patient not taking: Reported on 4/22/2025) 30 g 2    nystatin (MYCOSTATIN) powder Apply topically 2 (two) times daily. (Patient not taking: Reported on 4/22/2025) 60 g 1    tirzepatide 5 mg/0.5 mL PnIj Inject 5 mg into the skin every 7 days. compounded       No current facility-administered medications on file prior to visit.   [2]   Social History  Socioeconomic History    Marital status:    Tobacco Use    Smoking status: Never    Smokeless tobacco: Never   Substance and Sexual  Activity    Alcohol use: Yes     Social Drivers of Health     Financial Resource Strain: Low Risk  (9/3/2024)    Overall Financial Resource Strain (CARDIA)     Difficulty of Paying Living Expenses: Not hard at all   Food Insecurity: Patient Declined (9/3/2024)    Hunger Vital Sign     Worried About Running Out of Food in the Last Year: Patient declined     Ran Out of Food in the Last Year: Patient declined   Physical Activity: Insufficiently Active (9/3/2024)    Exercise Vital Sign     Days of Exercise per Week: 3 days     Minutes of Exercise per Session: 30 min   Stress: Patient Declined (9/3/2024)    [employer] - Occupational Stress Questionnaire     Feeling of Stress : Patient declined   Housing Stability: Unknown (9/3/2024)    Housing Stability Vital Sign     Unable to Pay for Housing in the Last Year: Patient declined

## 2025-04-23 LAB
CRP SERPL-MCNC: 30.2 MG/L
ERYTHROCYTE [SEDIMENTATION RATE] IN BLOOD BY PHOTOMETRIC METHOD: 33 MM/HR

## 2025-04-23 PROCEDURE — 83993 ASSAY FOR CALPROTECTIN FECAL: CPT | Performed by: INTERNAL MEDICINE

## 2025-04-23 NOTE — TELEPHONE ENCOUNTER
[facility]/Population Health Chart Review & Patient Outreach Details For Medication Adherence Project    Reason for Outreach Encounter: 3rd Party payor non-compliance report ([employer], [employer], [employer], etc)  2.  Patient Outreach Method: Reviewed patient chart   3.   Medication in question:  Metformin       4.  Reviewed and or Updates Made To: Patient Chart  5. Outreach Outcomes and/or actions taken: Medication discontinued  Additional Notes:

## 2025-04-24 LAB
CALPROTECTIN INTERP (OHS): ABNORMAL
CALPROTECTIN STOOL (OHS): >800

## 2025-04-30 ENCOUNTER — RESULTS FOLLOW-UP (OUTPATIENT)
Dept: GASTROENTEROLOGY | Facility: HOSPITAL | Age: 45
End: 2025-04-30

## 2025-04-30 DIAGNOSIS — K50.10 CROHN'S DISEASE OF LARGE INTESTINE WITHOUT COMPLICATION: Primary | ICD-10-CM

## 2025-05-01 ENCOUNTER — PATIENT MESSAGE (OUTPATIENT)
Dept: GASTROENTEROLOGY | Facility: CLINIC | Age: 45
End: 2025-05-01
Payer: COMMERCIAL

## 2025-05-06 ENCOUNTER — LAB VISIT (OUTPATIENT)
Dept: LAB | Facility: HOSPITAL | Age: 45
End: 2025-05-06
Attending: INTERNAL MEDICINE
Payer: COMMERCIAL

## 2025-05-06 DIAGNOSIS — K50.10 CROHN'S DISEASE OF LARGE INTESTINE WITHOUT COMPLICATION: ICD-10-CM

## 2025-05-06 DIAGNOSIS — K50.10 CROHN'S DISEASE OF LARGE INTESTINE WITHOUT COMPLICATION: Primary | ICD-10-CM

## 2025-05-06 LAB
HBV CORE AB SERPL QL IA: NORMAL
HBV CORE IGM SERPL QL IA: NORMAL
HBV SURFACE AB SER-ACNC: <3 MIU/ML
HBV SURFACE AB SERPL IA-ACNC: NORMAL M[IU]/ML
HBV SURFACE AG SERPL QL IA: NORMAL

## 2025-05-06 PROCEDURE — 86480 TB TEST CELL IMMUN MEASURE: CPT

## 2025-05-06 PROCEDURE — 86706 HEP B SURFACE ANTIBODY: CPT | Mod: 59

## 2025-05-06 PROCEDURE — 87340 HEPATITIS B SURFACE AG IA: CPT

## 2025-05-06 PROCEDURE — 36415 COLL VENOUS BLD VENIPUNCTURE: CPT

## 2025-05-06 PROCEDURE — 86705 HEP B CORE ANTIBODY IGM: CPT

## 2025-05-06 PROCEDURE — 86704 HEP B CORE ANTIBODY TOTAL: CPT

## 2025-05-06 RX ORDER — GUSELKUMAB 100 MG/ML
100 INJECTION SUBCUTANEOUS
Qty: 1 ML | Refills: 4 | Status: ACTIVE | OUTPATIENT
Start: 2025-05-06

## 2025-05-06 RX ORDER — GUSELKUMAB 200 MG/2ML
400 INJECTION SUBCUTANEOUS SEE ADMIN INSTRUCTIONS
Qty: 12 ML | Refills: 0 | Status: ACTIVE | OUTPATIENT
Start: 2025-05-06

## 2025-05-07 ENCOUNTER — PATIENT MESSAGE (OUTPATIENT)
Dept: GASTROENTEROLOGY | Facility: CLINIC | Age: 45
End: 2025-05-07
Payer: COMMERCIAL

## 2025-05-07 PROBLEM — K50.10 CROHN'S DISEASE OF LARGE INTESTINE WITHOUT COMPLICATION: Status: ACTIVE | Noted: 2025-05-07

## 2025-05-07 LAB
MITOGEN MINUS NIL (OHS): 1.48
NIL TB SYNCED (OHS): 0.03
QUANTIFERON GOLD INTERP (OHS): NEGATIVE
TB1 AG MINUS NIL (OHS): 0.05
TB2 AG MINUS NIL (OHS): 0.02

## 2025-05-09 ENCOUNTER — RESULTS FOLLOW-UP (OUTPATIENT)
Dept: GASTROENTEROLOGY | Facility: HOSPITAL | Age: 45
End: 2025-05-09

## 2025-05-15 ENCOUNTER — TELEPHONE (OUTPATIENT)
Dept: PHARMACY | Facility: CLINIC | Age: 45
End: 2025-05-15
Payer: COMMERCIAL

## 2025-05-20 ENCOUNTER — PATIENT MESSAGE (OUTPATIENT)
Dept: GASTROENTEROLOGY | Facility: CLINIC | Age: 45
End: 2025-05-20
Payer: COMMERCIAL

## 2025-05-29 ENCOUNTER — TELEPHONE (OUTPATIENT)
Dept: GASTROENTEROLOGY | Facility: CLINIC | Age: 45
End: 2025-05-29
Payer: COMMERCIAL

## 2025-05-29 NOTE — TELEPHONE ENCOUNTER
Spoke to patient. Patient states that she has not received Tremfya yet. She should receive it on the 4th. I informed her I will call back to ensure she received it and started it. She verbalized understanding and states she will most likely start it on 6/10 as this is a week after taking her other medication. She had some concerns taking them both on the same day. I verbalized understanding and will check back after 6/10. She agreed.

## 2025-06-12 ENCOUNTER — TELEPHONE (OUTPATIENT)
Dept: GASTROENTEROLOGY | Facility: CLINIC | Age: 45
End: 2025-06-12
Payer: COMMERCIAL

## 2025-06-12 NOTE — TELEPHONE ENCOUNTER
"Spoke to patient. She states that she is hesitant to take the medication. She states that she does not want to "poke" herself for the rest of her life. I offered assistance with the first injection by her coming to the office to inject with Ria. She denied. She states she will start the medication. She has been doing her research and she is just hesitant. She stopped taking  GLP-1. I asked her if she would like to talk to Dr. Quick about an alternative medication. She denied and states she will start taking it, she is just taking her time. I informed her I will call her in two weeks to check in to ensure we have started the medication. She verbalized understanding.  "

## 2025-06-25 ENCOUNTER — TELEPHONE (OUTPATIENT)
Dept: PHARMACY | Facility: CLINIC | Age: 45
End: 2025-06-25
Payer: COMMERCIAL

## 2025-06-26 ENCOUNTER — TELEPHONE (OUTPATIENT)
Dept: GASTROENTEROLOGY | Facility: CLINIC | Age: 45
End: 2025-06-26
Payer: COMMERCIAL

## 2025-06-26 NOTE — TELEPHONE ENCOUNTER
Spoke to patient. She states she took her first injection yesterday. Her first injection in her right thigh has medicine leaked out of the injection site after her injection. She readjusted her left leg and not a lot of medicine leaked out. She did have small knots at the injection site after. I informed her to ensure that the muscle is relaxed prior to injecting. She verbalized understanding. She states she has no injections on hand and if she is going to need to call. I informed her the next one is due in 4 weeks. [facility] should be calling to schedule a week prior to her next injection. She verbalized understanding.

## 2025-07-07 ENCOUNTER — TELEPHONE (OUTPATIENT)
Dept: PHARMACY | Facility: CLINIC | Age: 45
End: 2025-07-07
Payer: COMMERCIAL

## 2025-07-07 NOTE — TELEPHONE ENCOUNTER
[facility]/Population Health Chart Review & Patient Outreach Details For Medication Adherence Project     1.Reason for Outreach Encounter: 3rd Party payor non-compliance report ([employer], [employer], [facility], etc)  2.  Patient Outreach Method: Reviewed Patient Chart  3.   Medication in question: Metformin  LAST FILLED: n/a    Diabetes Medications              tirzepatide 5 mg/0.5 mL PnIj Inject 5 mg into the skin every 7 days. compounded              4.  Reviewed and or Updates Made To: Patient Chart  5. Outreach Outcomes and/or actions taken: Medication discontinued     Additional Notes: